# Patient Record
(demographics unavailable — no encounter records)

---

## 2017-03-30 NOTE — L&D FLOW SHEET
=================================================================

LD Flowsheet

=================================================================

Datetime Report Generated by CPN: 03/30/2017 20:00

   

   

=================================================================

Datetime: 03/30/2017 19:52

=================================================================

   

 NBP Sys/Rebecca/Mean (mmHg):  140 (QS system process)

:  74 (QS system process)

:  98 (QS system process)

 Pulse:  107 (QS system process)

 LaborFlag:  Labor (QS system process)

   

=================================================================

Datetime: 03/30/2017 19:50

=================================================================

   

 NBP Sys/Rebecca/Mean (mmHg):  144 (QS system process)

:  94 (QS system process)

:  115 (QS system process)

 LaborFlag:  Labor (QS system process)

   

=================================================================

Datetime: 03/30/2017 19:46

=================================================================

   

 NBP Sys/Rebecca/Mean (mmHg):  140 (Dacia Hernandez)

:  78 (Dacia Hernandez)

 Anesthesia Plans:  Epidural (Dacia Hernandez)

 Epidural Positioning:  Sitting (Dacia Hernandez)

 Epidural Procedure:  Test Dose (Dacia Hernandez)

 LaborFlag:  Labor (QS system process)

   

=================================================================

Datetime: 03/30/2017 19:44

=================================================================

   

 NBP Sys/Rebecca/Mean (mmHg):  139 (Dacia Hernandez)

:  77 (Dacia Hernandez)

 Anesthesia Plans:  Epidural (Dacia Hernandez)

 Epidural Positioning:  Sitting (Dacia Hernandez)

 Epidural Procedure:  Cath Placed (Dacia Hernandez)

 LaborFlag:  Labor (QS system process)

   

=================================================================

Datetime: 03/30/2017 19:43

=================================================================

   

 Anesthesia Plans:  Epidural (Dacia Hernandez)

 Epidural Positioning:  Sitting (Dacia Hernandez)

   

=================================================================

Datetime: 03/30/2017 19:42

=================================================================

   

 NBP Sys/Rebecca/Mean (mmHg):  140 (Dacia Hernandez)

:  76 (Dacia Hernandez)

 LaborFlag:  Labor (QS system process)

   

=================================================================

Datetime: 03/30/2017 19:40

=================================================================

   

 NBP Sys/Rebecca/Mean (mmHg):  138 (Dacia Hernandez)

:  79 (Dacia Hernandez)

 LaborFlag:  Labor (QS system process)

   

=================================================================

Datetime: 03/30/2017 19:38

=================================================================

   

 Anesthesia Comments:  Dr Kathrynkan at the bedside  (Dacia Hernandez)

   

=================================================================

Datetime: 03/30/2017 19:34

=================================================================

   

 NBP Sys/Rebecca/Mean (mmHg):  137 (QS system process)

:  78 (QS system process)

:  100 (QS system process)

 Pulse:  109 (QS system process)

 LaborFlag:  Labor (QS system process)

   

=================================================================

Datetime: 03/30/2017 19:29

=================================================================

   

 Pulse:  108 (QS system process)

 SpO2 (%):  92 (QS system process)

 LaborFlag:  Labor (QS system process)

   

=================================================================

Datetime: 03/30/2017 19:28

=================================================================

   

 Procedure Type:  epidural (Dacia Hernandez)

 Procedure Verify:  Correct Patient Identity; Correct Side and Site are

   Marked; Accurate Procedure Consent Form; Agreement on Procedure to

   be Done; Correct Patient Position; Relevant Images and Results are

   Properly Labeled and Displayed; Safety Precautions Based on Patient

   History or Medication Use (Dacia Hernandez)

 Anesthesia Plans:  Epidural (Dacia Hernandez)

 Epidural Positioning:  Sitting (Dacia Hernandez)

   

=================================================================

Datetime: 03/30/2017 19:22

=================================================================

   

 Anesthesia Comments:  patient sitting for epidural (Dacia Hernandez)

   

=================================================================

Datetime: 03/30/2017 19:19

=================================================================

   

 NBP Sys/Rebecca/Mean (mmHg):  148 (QS system process)

:  86 (QS system process)

:  110 (QS system process)

 Pulse:  115 (QS system process)

 LaborFlag:  Labor (QS system process)

   

=================================================================

Datetime: 03/30/2017 19:18

=================================================================

   

 Communication Comments:  Report given to AMary Hernandez RN (Shelley Rosenthal, RN)

   

=================================================================

Datetime: 03/30/2017 19:16

=================================================================

   

 NBP Sys/Rebecca/Mean (mmHg):  147 (QS system process)

:  76 (QS system process)

:  103 (QS system process)

 Pulse:  106 (QS system process)

 LaborFlag:  Labor (QS system process)

   

=================================================================

Datetime: 03/30/2017 19:15

=================================================================

   

 Monitor Mode:  External; Palpation (Shelley Rosenthal RN)

 Frequency (min):  1-4 (Shelley Rosenthal RN)

 Quality:  Moderate (Shelley Rosenthal RN)

 Duration (sec):  50-80 (Shelley Rosenthal RN)

 Resting Tone (Palpate):  Relaxed (Shelley Rosenthal RN)

 Monitor Mode:  External US (Shelley Rosenthal RN)

 FHR Baseline Rate :  130 (Shelley Rosenthal RN)

 Variability:  Moderate 6-25 bpm (Shelley Rosenthal RN)

 Accelerations:  15X15 (Shelley Rosenthal RN)

 Decelerations:  None (Shelley Rosenthal RN)

 Pitocin (milliunit):  Pitocin Remains (milliunits) @ 20 (Shelley Rosenthal RN)

 Procedure Type:  epidural (Dacia Hernandez)

 Procedure Verify:  Correct Patient Identity; Correct Side and Site are

   Marked; Accurate Procedure Consent Form; Agreement on Procedure to

   be Done; Correct Patient Position; Relevant Images and Results are

   Properly Labeled and Displayed; Safety Precautions Based on Patient

   History or Medication Use (Dacia Hernandez)

   

=================================================================

Datetime: 03/30/2017 19:13

=================================================================

   

 Monitor Interventions for FHR:  Ultrasound Adjusted (Shelley Rosenthal RN)

 Communication:  RN at Bedside (Shelley Rosenthal RN)

   

=================================================================

Datetime: 03/30/2017 19:11

=================================================================

   

 Patient Care Comments:  IV infiltrated. New IV started in L Forearm

   18gauge (Shelley Rosenthal RN)

   

=================================================================

Datetime: 03/30/2017 19:05

=================================================================

   

 NBP Sys/Rebecca/Mean (mmHg):  155 (QS system process)

:  105 (QS system process)

:  122 (QS system process)

 Pulse:  110 (QS system process)

 LaborFlag:  Labor (QS system process)

   

=================================================================

Datetime: 03/30/2017 19:00

=================================================================

   

 Monitor Mode:  External; Palpation (Shelley Rosenthal RN)

 Frequency (min):  1-4 (Shelley Rosenthal RN)

 Quality:  Moderate (Shelley Rosenthal RN)

 Duration (sec):  50-80 (Shelley Rosenthal RN)

 Resting Tone (Palpate):  Relaxed (Shelley Rosenthal RN)

 Monitor Mode:  External US (Shelley Rosenthal RN)

 FHR Baseline Rate :  125 (Shelley Rosenthal RN)

 Variability:  Moderate 6-25 bpm (Shelley Rosenthal RN)

 Accelerations:  15X15 (Shelley Rosenthal RN)

 Decelerations:  None (Shelley Rosenthal RN)

 Pitocin (milliunit):  Pitocin Remains (milliunits) @ 20 (Shelley Rosenthal RN)

 Procedure Type:  epidural (Daciamarlene Hernandez)

 Procedure Verify:  Correct Patient Identity; Correct Side and Site are

   Marked; Accurate Procedure Consent Form; Agreement on Procedure to

   be Done; Correct Patient Position; Relevant Images and Results are

   Properly Labeled and Displayed; Safety Precautions Based on Patient

   History or Medication Use (Daciamarlene Hernandez)

   

=================================================================

Datetime: 03/30/2017 18:56

=================================================================

   

 Bedside Blood Glucose:  65 L (Annotations: MD Notified) (QS system

   process)

 LaborFlag:  Labor (QS system process)

   

=================================================================

Datetime: 03/30/2017 18:49

=================================================================

   

 NBP Sys/Rebecca/Mean (mmHg):  141 (QS system process)

:  79 (QS system process)

:  102 (QS system process)

 Pulse:  103 (QS system process)

 LaborFlag:  Labor (QS system process)

   

=================================================================

Datetime: 03/30/2017 18:45

=================================================================

   

 Monitor Mode:  External (Shelley Rosenthal RN)

 Frequency (min):  1-3 (Shelley Rosenthal RN)

 Quality:  Mild/Moderate (Shelley Rosenthal RN)

 Duration (sec):  50-80 (Shelley Rosenthal RN)

 Resting Tone (Palpate):  Relaxed (Shelley Rosenthal RN)

 Monitor Mode:  External US (Shelley Rosenthal RN)

 FHR Baseline Rate :  120 (Shelley Rosenthal RN)

 Variability:  Moderate 6-25 bpm (Shelley Rosenthal RN)

 Accelerations:  15X15 (Shelley Rosenthal RN)

 Decelerations:  None (Shelley Rosenthal RN)

 Pitocin (milliunit):  Pitocin Remains (milliunits) @ 20 (Shelley Rosenthal RN)

   

=================================================================

Datetime: 03/30/2017 18:39

=================================================================

   

 Monitor Interventions for UA:  [place] Adjusted (Shelley Rosenthal RN)

 Dilatation (cm):  5.5 (Shelley Rosenthal RN)

 Effacement (%):  80 (Shelley Rosenthal RN)

 Station:  -1 (Shelely Rosenthal RN)

 Exam by:  CHERYL Baker RNC (Shelley Rosenthal RN)

   

=================================================================

Datetime: 03/30/2017 18:34

=================================================================

   

 NBP Sys/Rebecca/Mean (mmHg):  137 (QS system process)

:  99 (QS system process)

:  114 (QS system process)

 Pulse:  100 (QS system process)

 LaborFlag:  Labor (QS system process)

   

=================================================================

Datetime: 03/30/2017 18:30

=================================================================

   

 Monitor Mode:  External (Shelley Rosenthal RN)

 Frequency (min):  1-4 (Shelley Rosenthal RN)

 Quality:  Mild/Moderate (Shelley Rosenthal RN)

 Duration (sec):  50-80 (Shelley Rosenthal RN)

 Resting Tone (Palpate):  Relaxed (Shelley Rosenthal RN)

 Monitor Mode:  External US (Shelley Rosenthal RN)

 FHR Baseline Rate :  125 (Shelley Rosenthal RN)

 Variability:  Moderate 6-25 bpm (Shelley Rosenthal RN)

 Accelerations:  15X15 (Shelley Rosenthal RN)

 Decelerations:  None (Shelley Rosenthal RN)

 Pitocin (milliunit):  Pitocin Remains (milliunits) @ 20 (Shelley Rosenthal RN)

   

=================================================================

Datetime: 03/30/2017 18:16

=================================================================

   

 Pain Coping:  Requesting Pain Medication or Epidural (Shelley Rosenthal RN)

 IV/Blood Work:  IV Bolus Started (Shelley Rosenthal RN)

 Procedure Verify:  Correct Patient Identity; Correct Side and Site are

   Marked; Accurate Procedure Consent Form; Agreement on Procedure to

   be Done (Shelley Rosenthal RN)

 Anesthesia Plans:  Epidural (Shelley Rosenthal RN)

   

=================================================================

Datetime: 03/30/2017 18:15

=================================================================

   

 Monitor Mode:  External; Palpation (Shelley Rosenthal RN)

 Quality:  Mild/Moderate (Shelley Rosenthal RN)

 Resting Tone (Palpate):  Relaxed (Shelley Rosenthal RN)

 Contraction Comments:  contractions not tracing on monitor (Shelley Rosenthal RN)

 Monitor Mode:  External US (Shelley Rosenthal RN)

 FHR Baseline Rate :  135 (Shelley Rosenthal RN)

 Variability:  Moderate 6-25 bpm (Shelley Rosenthal RN)

 Accelerations:  10X10 (Shelley Rosenthal RN)

 Decelerations:  None (Shelley Rosenthal RN)

 Pitocin (milliunit):  Pitocin Remains (milliunits) @ 20 (Shelley Rosenthal RN)

   

=================================================================

Datetime: 03/30/2017 18:14

=================================================================

   

 Dilatation (cm):  3.0 (Shelley Rosenthal, RN)

 Effacement (%):  70 (Shelley Rosenthal, RN)

 Station:  -1 (Shelley Rosenthal, RN)

 Exam by:  JORGITO Rosenthal RN (Shelley Rosenthal RN)

   

=================================================================

Datetime: 03/30/2017 18:10

=================================================================

   

 Monitor Interventions for UA:  [place] Adjusted (Shelley Rosenthal, RN)

   

=================================================================

Datetime: 03/30/2017 18:01

=================================================================

   

 Pitocin (milliunit):  Pitocin Remains (milliunits) @ 20 (Shelley Rosenthal,

   RN)

   

=================================================================

Datetime: 03/30/2017 18:00

=================================================================

   

 Monitor Mode:  External (Shelley Rosenthal, RN)

 Frequency (min):  1-3 (Shelley Rosenthal, RN)

 Quality:  Mild/Moderate (Shelley Rosenthal, RN)

 Duration (sec):  50-80 (Shelley Rosenthal, RN)

 Resting Tone (Palpate):  Relaxed (Shelley Rosenthal, RN)

 Monitor Mode:  External US (Shelley Rosenthal, RN)

 FHR Baseline Rate :  135 (Shelley Rosenthal, RN)

 Variability:  Moderate 6-25 bpm (Shelley Rosenthal, RN)

 Accelerations:  10X10 (Shelley Rosenthal, RN)

 Decelerations:  None (Shleley Rosenthal, RN)

   

=================================================================

Datetime: 03/30/2017 17:55

=================================================================

   

 Monitor Interventions for UA:  [place] Adjusted (Shelley Rosenthal, RN)

   

=================================================================

Datetime: 03/30/2017 17:54

=================================================================

   

 Monitor Interventions for FHR:  Ultrasound Adjusted (Shelley Rosenthal RN)

 Communication:  RN at Bedside (Shelley Rosenthal RN)

   

=================================================================

Datetime: 03/30/2017 17:45

=================================================================

   

 Monitor Mode:  External; Palpation (Shelley Rosenthal RN)

 Frequency (min):  1-3 (Shelley Rosenthal RN)

 Quality:  Mild/Moderate (Shelley Rosenthal RN)

 Duration (sec):  50-80 (Shelley Rosenthal, RN)

 Resting Tone (Palpate):  Relaxed (Shelley Rosenthal RN)

 Monitor Mode:  External US (Shleley Rosenthal RN)

 FHR Baseline Rate :  125 (Shelley Rosenthal RN)

 Variability:  Moderate 6-25 bpm (Shelley Rosenthal, RN)

 Accelerations:  15X15 (Shelley Rosenthal, RN)

 Decelerations:  None (Shelley Rosenthal, RN)

 Pain Coping:  Breathing Through Contractions (Shelley Rosenthal RN)

 Pain Assessment Comments:  Pt asking about different pain control.

   Denies need for pain medicine or epidural at this time. Offered warm

   compress, position changes. FOB at bedside.  (Shelley Rosenthal RN)

 Pitocin (milliunit):  Pitocin Remains (milliunits) @ 20 (Shelley Rosenthal

   RN)

 Comfort Measures:  Breathing/Relaxation; Coaching; Hot/Cold Pack;

   Family Support (Shelley Rosenthal RN)

 LaborFlag:  Labor (QS system process)

   

=================================================================

Datetime: 03/30/2017 17:35

=================================================================

   

 NBP Sys/Rebecca/Mean (mmHg):  141 (QS system process)

:  77 (QS system process)

:  103 (QS system process)

 Pulse:  104 (QS system process)

 LaborFlag:  Labor (QS system process)

   

=================================================================

Datetime: 03/30/2017 17:30

=================================================================

   

 Monitor Mode:  External; Palpation (Shelley Rosenthal RN)

 Frequency (min):  1-5 (Shelley Rosenthal RN)

 Quality:  Mild/Moderate (Shelley Rosenthal RN)

 Duration (sec):  50-80 (Shelley Rosenthal RN)

 Resting Tone (Palpate):  Relaxed (Shelley Rosenthal RN)

 Monitor Mode:  External US (Shelley Rosenthal RN)

 FHR Baseline Rate :  135 (Shelley Rosenthal RN)

 Variability:  Moderate 6-25 bpm (Shelley Rosenthal RN)

 Accelerations:  15X15 (Shelley Rosenthal RN)

 Decelerations:  None (Shelley Rosenthal RN)

 Pitocin (milliunit):  Pitocin Remains (milliunits) @ 20 (Shelley Rosenthal RN)

   

=================================================================

Datetime: 03/30/2017 17:19

=================================================================

   

 NBP Sys/Rebecca/Mean (mmHg):  132 (QS system process)

:  73 (QS system process)

:  97 (QS system process)

 Pulse:  90 (QS system process)

 LaborFlag:  Labor (QS system process)

   

=================================================================

Datetime: 03/30/2017 17:15

=================================================================

   

 Monitor Mode:  External (Shelley Rosenthal RN)

 Frequency (min):  1-3 (Shelley Rosenthal RN)

 Quality:  Mild (Shelley Rosenthal RN)

 Duration (sec):  50-80 (Shelley Rosenthal RN)

 Resting Tone (Palpate):  Relaxed (Shelley Rosenthal RN)

 Monitor Mode:  External US (Shelley Rosenthal RN)

 FHR Baseline Rate :  135 (Shelley Rosenthal RN)

 Variability:  Moderate 6-25 bpm (Shelley Rosenthal RN)

 Accelerations:  15X15 (Shelley Rosenthal RN)

 Decelerations:  None (Shelley Rosenthal RN)

 Pitocin (milliunit):  Pitocin Remains (milliunits) @ 20 (Shelley Rosenthal RN)

   

=================================================================

Datetime: 03/30/2017 17:09

=================================================================

   

 Respirations:  16 (Shelley Rosenthal RN)

 Temperature (F):  98.6 (Shelley Rosenthal RN)

 Temperature (C):  37.0 (QS system process)

 Pain Scale:  4 (Shelley Rosenthal RN)

 Pain Presence:  Intermittent (Shelley Rosenthal RN)

 Pain Type:  Contraction (Shelley Rosenthal RN)

 Pain Location:  Abdomen (Shelley Rosenthal RN)

 Pain Relief Measures:  Comfort Measures (Shelley Rosenthal RN)

 Pain Coping:  Talking Through Contractions (Shelley Rosenthal RN)

 LaborFlag:  Labor (QS system process)

   

=================================================================

Datetime: 03/30/2017 17:04

=================================================================

   

 NBP Sys/Rebecca/Mean (mmHg):  135 (QS system process)

:  77 (QS system process)

:  100 (QS system process)

 Pulse:  95 (QS system process)

 LaborFlag:  Labor (QS system process)

   

=================================================================

Datetime: 03/30/2017 17:00

=================================================================

   

 Monitor Mode:  External (Shelley Rosenthal, RN)

 Frequency (min):  1-3 (Shelley Rosenthal, RN)

 Quality:  Mild (Shelley Galo, RN)

 Duration (sec):  50-80 (Shelley Galo, RN)

 Resting Tone (Palpate):  Relaxed (Shelley Rosenthal, RN)

 Monitor Mode:  External US (Shelley Rosenthal, RN)

 FHR Baseline Rate :  125 (Shelley Galo, RN)

 Variability:  Moderate 6-25 bpm (Shelley Rosenthal, RN)

 Accelerations:  15X15 (Shelley Galo, RN)

 Decelerations:  None (Shelleysawyer Rosenthal, RN)

 Pitocin (milliunit):  Pitocin Remains (milliunits) @ 20 (Shelley Galo,

   RN)

   

=================================================================

Datetime: 03/30/2017 16:50

=================================================================

   

 NBP Sys/Rebecca/Mean (mmHg):  134 (QS system process)

:  77 (QS system process)

:  99 (QS system process)

 Pulse:  96 (QS system process)

 LaborFlag:  Labor (QS system process)

   

=================================================================

Datetime: 03/30/2017 16:45

=================================================================

   

 Monitor Mode:  External (Shelley Rosenthal, RN)

 Frequency (min):  1-5 (Shelley Rosenthal RN)

 Quality:  Mild (Shelley Rosenthal, RN)

 Duration (sec):  50-80 (Shelley Rosenthal, RN)

 Duration Criteria:  Less than Two 120 Second Contractions (Shelley Rosenthal, RN)

 Pattern:  Normal: <= 5 Contractions in 10 Minutes (Shelley Rosenthal RN)

 Resting Tone (Palpate):  Relaxed (Shelley Rosenthal, RN)

 Monitor Mode:  External US (Shelley Rosenthal, RN)

 FHR Baseline Rate :  125 (Shelley Rosenthal, RN)

 Variability:  Moderate 6-25 bpm (Shelley Rosenthal, RN)

 Accelerations:  15X15 (Shelley Rosenthal, RN)

 Decelerations:  None (Shelley Rosenthal, RN)

 Pitocin (milliunit):  Pitocin Remains (milliunits) @ 20 (Shelley Rosenthal,

   RN)

   

=================================================================

Datetime: 03/30/2017 16:34

=================================================================

   

 NBP Sys/[place]/Mean (mmHg):  120 (QS system process)

:  71 (QS system process)

:  90 (QS system process)

 Pulse:  91 (QS system process)

 LaborFlag:  Labor (QS system process)

   

=================================================================

Datetime: 03/30/2017 16:30

=================================================================

   

 Monitor Mode:  External (Shelley Galo, RN)

 Frequency (min):  1-4 (Shelley Rosenthal, RN)

 Quality:  Mild (Shelley Rosenthal, RN)

 Duration (sec):  50-80 (Shelley Galo, RN)

 Resting Tone (Palpate):  Relaxed (Shelleysawyer Rosenthal, RN)

 Monitor Mode:  External US (Shelleysawyer Rosenthal, RN)

 FHR Baseline Rate :  125 (Shelley Galo, RN)

 Variability:  Moderate 6-25 bpm (Shelley Galo, RN)

 Accelerations:  15X15 (Shelley Galo, RN)

 Decelerations:  None (Shelleysawyer Rosenthal, RN)

 Pitocin (milliunit):  Pitocin Remains (milliunits) @ 20 (Shelley Galo,

   RN)

   

=================================================================

Datetime: 03/30/2017 16:19

=================================================================

   

 NBP Sys/Rebecca/Mean (mmHg):  128 (QS system process)

:  78 (QS system process)

:  97 (QS system process)

 Pulse:  81 (QS system process)

 LaborFlag:  Labor (QS system process)

   

=================================================================

Datetime: 03/30/2017 16:15

=================================================================

   

 Monitor Mode:  External (Shelley Rosenthal RN)

 Frequency (min):  1-3 (Shelley Rosenthal RN)

 Quality:  Mild (Shelley Rosenthal RN)

 Duration (sec):  50-80 (Shelley Rosenthal RN)

 Resting Tone (Palpate):  Relaxed (Shelley Rosenthal RN)

 Monitor Mode:  External US (Shelley Rosenthal RN)

 FHR Baseline Rate :  135 (Shelley Rosenthal RN)

 Variability:  Moderate 6-25 bpm (Shelley Rosenthal RN)

 Accelerations:  15X15 (Shelley Rosenthal RN)

 Decelerations:  None (Shelley Rosenthal RN)

 Pitocin (milliunit):  Pitocin Remains (milliunits) @ 20 (Shelley Rosenthal RN)

   

=================================================================

Datetime: 03/30/2017 16:07

=================================================================

   

 Dilatation (cm):  2.0 (Shelley Rosenthal RN)

 Effacement (%):  60 (Shelley Rosenthal RN)

 Station:  -2 (Shelley Rosenthal RN)

 Exam by:  REX alex CNM (Shelley Rosenthal RN)

 Membrane Status:  Ruptured (Shelley Rosenthal RN)

 Membranes Rupture Method:  Artificial (Shelley Rosenthal RN)

 Amniotic Fluid Color:  Clear (Shelley Rosenthal RN)

 Amniotic Fluid Amount:  Large (Shelley Rosenthal RN)

 Amniotic Fluid Odor:  Normal (Shelley Rosenthal RN)

   

=================================================================

Datetime: 03/30/2017 16:05

=================================================================

   

 Communication Comments:  REX Alex CNM at bedside (Shelley Rosenthal RN)

   

=================================================================

Datetime: 03/30/2017 16:00

=================================================================

   

 Monitor Mode:  External (Shelley Rosenthal RN)

 Frequency (min):  1-3 (Shelley Rosenthal RN)

 Quality:  Mild (Shelley Rosenthal RN)

 Duration (sec):  50-80 (Shelley Rosenthal RN)

 Resting Tone (Palpate):  Relaxed (Shelley Rosenthal RN)

 Monitor Mode:  External US (Shelley Rosenthal RN)

 FHR Baseline Rate :  130 (Shelley Rosenthal RN)

 Variability:  Moderate 6-25 bpm (Shelley Rosenthal RN)

 Accelerations:  15X15 (Shelley Rosenthal RN)

 Decelerations:  None (Shelley Rosenthal RN)

 Pitocin (milliunit):  Pitocin Remains (milliunits) @ 20 (Shelley Rosenthal RN)

 I/O Interventions:  Up to BR (Shelley Rosenthal RN)

   

=================================================================

Datetime: 03/30/2017 15:45

=================================================================

   

 Monitor Mode:  External (Shelley Rosenthal, RN)

 Frequency (min):  1-3 (Shelleysawyer Rosenthal, RN)

 Quality:  Mild (Shelley Galo, RN)

 Duration (sec):  50-80 (Shelleysawyer Rosenthal, RN)

 Duration Criteria:  Less than Two 120 Second Contractions (Shelleysawyer Rosenthal, RN)

 Pattern:  Normal: <= 5 Contractions in 10 Minutes (Shelley Galo, RN)

 Resting Tone (Palpate):  Relaxed (Shelley Rosenthal, RN)

 Monitor Mode:  External US (Shelley Rosenthal, RN)

 FHR Baseline Rate :  130 (Shelleysawyer Rosenthal, RN)

 Variability:  Moderate 6-25 bpm (Shelley Rosenthal, RN)

 Accelerations:  15X15 (Shelley Rosenthal, RN)

 Decelerations:  None (Shelleysawyer Rosenthal, RN)

 Pitocin (milliunit):  Pitocin Remains (milliunits) @ 20 (Shelley Galo,

   RN)

   

=================================================================

Datetime: 03/30/2017 15:37

=================================================================

   

 I/O Interventions:  Up to BR (Shelley Rosenthal, RN)

   

=================================================================

Datetime: 03/30/2017 15:30

=================================================================

   

 Monitor Mode:  External (Shelleysawyer Rosenthal, RN)

 Frequency (min):  1-3 (Shelley Rosenthal, RN)

 Quality:  Mild (Shelley Galo, RN)

 Duration (sec):  50-80 (Shelleysawyer Rosenthal, RN)

 Resting Tone (Palpate):  Relaxed (Shelley Rosenthal, RN)

 Monitor Mode:  External US (Shelley Rosenthal, RN)

 FHR Baseline Rate :  130 (Shelleysawyer Rosenthal, RN)

 Variability:  Moderate 6-25 bpm (Shelleysawyer Rosenthal, RN)

 Accelerations:  15X15 (Shelleysawyer Rosenthal, RN)

 Decelerations:  None (Shelley Rosenthal, RN)

 Pitocin (milliunit):  Pitocin Remains (milliunits) @ 20 (Shelley Galo,

   RN)

   

=================================================================

Datetime: 03/30/2017 15:23

=================================================================

   

 IV/Blood Work:  New IV Bag Hung (Shelley Galo, RN)

   

=================================================================

Datetime: 03/30/2017 15:15

=================================================================

   

 Monitor Mode:  External (Shelley Rosenthal, RN)

 Frequency (min):  1-3 (Shelley Rosenthal, RN)

 Quality:  Mild (Shelley Rosenthal, RN)

 Duration (sec):  50-80 (Shelley Rosenthal, RN)

 Resting Tone (Palpate):  Relaxed (Shelley Rosenthal, RN)

 Monitor Mode:  External US (Shelley Rosenthal, RN)

 FHR Baseline Rate :  135 (Shelley Rosenthal, RN)

 Variability:  Moderate 6-25 bpm (Shelley Rosenthal, RN)

 Accelerations:  15X15 (Shelley Rosenthal, RN)

 Decelerations:  None (Shelley Rosenthal, RN)

 Pitocin (milliunit):  Pitocin Remains (milliunits) @ 20 (Shelley Rosenthal,

   RN)

   

=================================================================

Datetime: 03/30/2017 15:00

=================================================================

   

 Monitor Mode:  External (Shelley Rosenthal, RN)

 Frequency (min):  1-3 (Shelley Rosenthal, RN)

 Quality:  Mild (Shelley Rosenthal, RN)

 Duration (sec):  50-80 (Shelley Rosenthal, RN)

 Resting Tone (Palpate):  Relaxed (Shelley Rosenthal, RN)

 Monitor Mode:  External US (Shelley Rosenthal, RN)

 FHR Baseline Rate :  135 (Shelley Rosenthal, RN)

 Variability:  Moderate 6-25 bpm (Shelley Rosenthal, RN)

 Accelerations:  15X15 (Shelley Rosenthal, RN)

 Decelerations:  None (Shelley Rosenthal, RN)

 Pitocin (milliunit):  Pitocin Remains (milliunits) @ 20 (Shelley Rosenthal,

   RN)

   

=================================================================

Datetime: 03/30/2017 14:45

=================================================================

   

 Monitor Mode:  External (Shelley Rosenthal RN)

 Frequency (min):  1-2 (Shelley Rosenthal RN)

 Quality:  Mild (Shelley Rosenthal, RN)

 Duration (sec):  50-80 (Shelley Rosenthal RN)

 Resting Tone (Palpate):  Relaxed (Shelley Rosenthal RN)

 Monitor Mode:  External US (Shelley Rosenthal RN)

 FHR Baseline Rate :  125 (Shelley Rosenthal RN)

 Variability:  Moderate 6-25 bpm (Shelley Rosenthal, RN)

 Accelerations:  15X15 (Shelley Rosenthal, RN)

 Decelerations:  None (Shelley Rosenthal, RN)

 Pitocin (milliunit):  Pitocin Remains (milliunits) @ 20 (Shelley Rosenthal,

   RN)

   

=================================================================

Datetime: 03/30/2017 14:33

=================================================================

   

 Monitor Interventions for FHR:  Ultrasound Adjusted (Shelley Rosenthal RN)

 Communication:  RN at Bedside (Shelley Rosenthal RN)

   

=================================================================

Datetime: 03/30/2017 14:31

=================================================================

   

 Patient Care Comments:  Pt sitting on edge of bed (Shelley Rosenthal, RN)

   

=================================================================

Datetime: 03/30/2017 14:30

=================================================================

   

 Monitor Mode:  External (Shelley Rosenthal RN)

 Frequency (min):  1-3 (Shelley Rosenthal RN)

 Quality:  Mild (Shelley Rosenthal, RN)

 Duration (sec):  50-80 (Shelley Rosenthal, RN)

 Resting Tone (Palpate):  Relaxed (Shelley Rosenthal RN)

 Monitor Mode:  External US (Shelley Rosenthal RN)

 FHR Baseline Rate :  130 (Shelley Rosenthal RN)

 Variability:  Moderate 6-25 bpm (Shelley Rosenthal, RN)

 Accelerations:  15X15 (Shelley Rosenthal, RN)

 Decelerations:  None (Shelley Rosenthal, RN)

 Pitocin (milliunit):  Pitocin Remains (milliunits) @ 20 (Shelley Rosenthal

   RN)

   

=================================================================

Datetime: 03/30/2017 14:19

=================================================================

   

 NBP Sys/Rebecca/Mean (mmHg):  113 (QS system process)

:  71 (QS system process)

:  87 (QS system process)

 Pulse:  96 (QS system process)

 LaborFlag:  Labor (QS system process)

   

=================================================================

Datetime: 03/30/2017 14:15

=================================================================

   

 Monitor Mode:  External (Shelley Rosenthal RN)

 Frequency (min):  1-3 (Shelley Rosenthal RN)

 Quality:  Mild (Shelley Rosenthal RN)

 Duration (sec):  50-80 (Shelley Rosenthal RN)

 Resting Tone (Palpate):  Relaxed (Shelley Rosenthal RN)

 Monitor Mode:  External US (Shelley Rosenthal RN)

 FHR Baseline Rate :  135 (Shelley Rosenthal RN)

 Variability:  Moderate 6-25 bpm (Shelley Rosenthal RN)

 Accelerations:  15X15 (Shelley Rosenthal, RN)

 Decelerations:  None (Shelley Rosenthal, RN)

 Pitocin (milliunit):  Pitocin Remains (milliunits) @ 20 (Shelley Rosenthal RN)

   

=================================================================

Datetime: 03/30/2017 14:05

=================================================================

   

 NBP Sys/Rebecca/Mean (mmHg):  126 (QS system process)

:  76 (QS system process)

:  94 (QS system process)

 Pulse:  96 (QS system process)

 LaborFlag:  Labor (QS system process)

   

=================================================================

Datetime: 03/30/2017 14:01

=================================================================

   

 Patient Position/Activity:  Right Lateral (Shelley Rosenthal RN)

   

=================================================================

Datetime: 03/30/2017 14:00

=================================================================

   

 Monitor Mode:  External (Shelley Rosenthal RN)

 Frequency (min):  1-2 (Shelley Rosenthal RN)

 Quality:  Mild (Shelley Rosenthal RN)

 Duration (sec):  50-80 (Shelley Rosenthal RN)

 Resting Tone (Palpate):  Relaxed (Shelley Rosenthal RN)

 Monitor Mode:  External US (Shelley Rosenthal RN)

 FHR Baseline Rate :  135 (Shelley Rosenthal RN)

 Variability:  Moderate 6-25 bpm (Shelley Rosenthal RN)

 Accelerations:  15X15 (Shelley Rosenthal RN)

 Decelerations:  None (Shelley Rosenthal RN)

 Pitocin (milliunit):  Pitocin Remains (milliunits) @ 20 (Shelley Rosenthal RN)

   

=================================================================

Datetime: 03/30/2017 13:49

=================================================================

   

 NBP Sys/Rebecca/Mean (mmHg):  134 (QS system process)

:  67 (QS system process)

:  95 (QS system process)

 Pulse:  98 (QS system process)

 LaborFlag:  Labor (QS system process)

   

=================================================================

Datetime: 03/30/2017 13:45

=================================================================

   

 Monitor Mode:  External (Shelley Rosenthal RN)

 Frequency (min):  1-3 (Shelley Rosenthal RN)

 Quality:  Mild (Shelley Rosenthal RN)

 Duration (sec):  50-80 (Shelley Rosenthal RN)

 Resting Tone (Palpate):  Relaxed (Shelley Rosenthal RN)

 Monitor Mode:  External US (Shelley Rosenthal RN)

 FHR Baseline Rate :  135 (Shelley Rosenthal RN)

 Variability:  Moderate 6-25 bpm (Shelley Rosenthal RN)

 Accelerations:  15X15 (Shelley Rosenthal RN)

 Decelerations:  None (Shelley Rosenthal RN)

 Pitocin (milliunit):  Pitocin Remains (milliunits) @ 20 (Shelley Rosenthal RN)

   

=================================================================

Datetime: 03/30/2017 13:34

=================================================================

   

 NBP Sys/Rebecca/Mean (mmHg):  126 (QS system process)

:  66 (QS system process)

:  85 (QS system process)

 Pulse:  96 (QS system process)

 LaborFlag:  Labor (QS system process)

   

=================================================================

Datetime: 03/30/2017 13:30

=================================================================

   

 Monitor Mode:  External (Shelley Rosenthal RN)

 Frequency (min):  1-3 (Shelley Rosenthal RN)

 Quality:  Mild (Shelley Rosenthal RN)

 Duration (sec):  50-80 (Shelley Rosenthal RN)

 Resting Tone (Palpate):  Relaxed (Shelley Rosenthal RN)

 Monitor Mode:  External US (Shelley Rosenthal RN)

 FHR Baseline Rate :  130 (Shelley Rosenthal RN)

 Variability:  Moderate 6-25 bpm (Shelley Rosenthal RN)

 Accelerations:  15X15 (Shelley Rosenthal RN)

 Decelerations:  None (Shelley Rosenthal RN)

 Pitocin (milliunit):  Pitocin Increased to (milliunits) @ 20 (Shelley Rosenthal RN)

   

=================================================================

Datetime: 03/30/2017 13:27

=================================================================

   

 Patient Position/Activity:  Left Lateral (Shelley Rosenthal, RN)

   

=================================================================

Datetime: 03/30/2017 13:26

=================================================================

   

 Comments:  tracing maternal HR, adjusted ultrasound (Shelley Rosenthal, RN)

   

=================================================================

Datetime: 03/30/2017 13:20

=================================================================

   

 I/O Interventions:  Up to BR (Shelley Rosenthal, RN)

   

=================================================================

Datetime: 03/30/2017 13:15

=================================================================

   

 Monitor Mode:  External (Shelley Rosenthal RN)

 Frequency (min):  1-2 (Shelley Rosenthal, RN)

 Quality:  Mild (Shelley Rosenthal, RN)

 Duration (sec):  50-80 (Shelley Rosenthal, RN)

 Resting Tone (Palpate):  Relaxed (Shelley Rosenthal, RN)

 Monitor Mode:  External US (Shelley Rosenthal RN)

 FHR Baseline Rate :  135 (Shelley Rosenthal, RN)

 Variability:  Moderate 6-25 bpm (Shelley Rosenthal, RN)

 Accelerations:  15X15 (Shelley Rosenthal, RN)

 Decelerations:  None (Shelley Rosenthal, RN)

 Pitocin (milliunit):  Pitocin Increased to (milliunits) @ 18 (Shelley Rosenthal, RN)

   

=================================================================

Datetime: 03/30/2017 13:05

=================================================================

   

 NBP Sys/Rebecca/Mean (mmHg):  121 (QS system process)

:  82 (QS system process)

:  95 (QS system process)

 Pulse:  88 (QS system process)

 LaborFlag:  Labor (QS system process)

   

=================================================================

Datetime: 03/30/2017 13:00

=================================================================

   

 Monitor Mode:  External (Shelley Rosenthal, RN)

 Frequency (min):  1-3 (Shelley Rosenthal RN)

 Quality:  Mild (Shelley Rosenthal, RN)

 Duration (sec):  50-80 (Shelley Rosenthal, RN)

 Resting Tone (Palpate):  Relaxed (Shelley Rosenthal, RN)

 Monitor Mode:  External US (Shelley Rosenthal, RN)

 FHR Baseline Rate :  135 (Shelley Rosenthal RN)

 Variability:  Moderate 6-25 bpm (Shelley Rosenthal, RN)

 Accelerations:  15X15 (Shelley Rosenthal, RN)

 Decelerations:  None (Shelley Rosenthal, RN)

 Pitocin (milliunit):  Pitocin Increased to (milliunits) @ 16 (Shelley Rosenthal, RN)

   

=================================================================

Datetime: 03/30/2017 12:49

=================================================================

   

 NBP Sys/Rebecca/Mean (mmHg):  127 (QS system process)

:  57 (QS system process)

:  82 (QS system process)

 Pulse:  87 (QS system process)

 LaborFlag:  Labor (QS system process)

   

=================================================================

Datetime: 03/30/2017 12:45

=================================================================

   

 Monitor Mode:  External (Shelley Rosenthal, RN)

 Frequency (min):  1-4 (Shelley Rosenthal, RN)

 Quality:  Mild (Shelley Rosenthal, RN)

 Duration (sec):  50-80 (Shelley Rosenthal, RN)

 Resting Tone (Palpate):  Relaxed (Shelley Rosenthal, RN)

 Monitor Mode:  External US (Shelley Rosenthal, RN)

 FHR Baseline Rate :  125 (Shelley Rosenthal, RN)

 Variability:  Moderate 6-25 bpm (Shelley Rosenthal, RN)

 Accelerations:  None (Shelley Rosenthal, RN)

 Decelerations:  None (Shelley Rosenthal, RN)

 Pitocin (milliunit):  Pitocin Remains (milliunits) @ 14 (Shelley Rosenthal,

   RN)

   

=================================================================

Datetime: 03/30/2017 12:33

=================================================================

   

 Monitor Interventions for FHR:  Ultrasound Adjusted (Shelley Rosenthal, RN)

   

=================================================================

Datetime: 03/30/2017 12:30

=================================================================

   

 Respirations:  16 (Shelley Rosenthal RN)

 Temperature (F):  98.0 (Shelley Rosenthal RN)

 Temperature (C):  36.7 (QS system process)

 Monitor Mode:  External (Shelley Rosenthal RN)

 Frequency (min):  1-3 (Shelley Rosenthal RN)

 Quality:  Mild (Shelley Rosenthal RN)

 Duration (sec):  50-80 (Shelley Rosenthal RN)

 Resting Tone (Palpate):  Relaxed (Shelley Rosenthal RN)

 Monitor Mode:  External US (Shelley Rosenthal RN)

 FHR Baseline Rate :  125 (Shelley Rosenthal RN)

 Variability:  Moderate 6-25 bpm (Shelley Rosenthal RN)

 Decelerations:  None (Shelley Rosenthal RN)

 Pain Scale:  1 (Shelley Rosenthal RN)

 Pain Presence:  Intermittent (Shelley Rosenthal RN)

 Pain Type:  Contraction (Shelley Rosenthal RN)

 Pain Location:  Abdomen (Shelley Rosenthal RN)

 Pain Relief Measures:  Comfort Measures (Shelley Rosenthal RN)

 Pain Coping:  Talking Through Contractions (Shelley Rosenthal RN)

 Pitocin (milliunit):  Pitocin Remains (milliunits) @ 14 (Shelley Rosenthal RN)

 LaborFlag:  Labor (QS system process)

   

=================================================================

Datetime: 03/30/2017 12:29

=================================================================

   

 Patient Position/Activity:  Right Lateral (Shelley Rosenthal RN)

   

=================================================================

Datetime: 03/30/2017 12:19

=================================================================

   

 NBP Sys/Rebecca/Mean (mmHg):  111 (QS system process)

:  69 (QS system process)

:  84 (QS system process)

 Pulse:  94 (QS system process)

 LaborFlag:  Labor (QS system process)

   

=================================================================

Datetime: 03/30/2017 12:15

=================================================================

   

 Monitor Mode:  External (Shelley Rosenthal RN)

 Frequency (min):  1-2 (Shelley Rosenthal RN)

 Quality:  Mild (Shelley Rosenthal RN)

 Duration (sec):  50-80 (Shelley Rosenthal RN)

 Resting Tone (Palpate):  Relaxed (Shelley Rosenthal RN)

 Monitor Mode:  External US (Shelley Rosenthal RN)

 FHR Baseline Rate :  135 (Shelley Rosenthal RN)

 Variability:  Moderate 6-25 bpm (Shelley Rosenthal RN)

 Decelerations:  None (Shelley Rosenthal RN)

 Pitocin (milliunit):  Pitocin Increased to (milliunits) @ 14 (Shelley Rosenthal RN)

   

=================================================================

Datetime: 03/30/2017 12:01

=================================================================

   

 Monitor Interventions for UA:  [place] Adjusted (Shelley Rosenthal RN)

 Comfort Measures:  Rocking Chair (Shelley Rosenthal RN)

   

=================================================================

Datetime: 03/30/2017 12:00

=================================================================

   

 Monitor Mode:  External (Shelley Rosenthal RN)

 Frequency (min):  1-3 (Shelley Rosenthal RN)

 Quality:  Mild (Shelley Rosenthal RN)

 Duration (sec):  50-80 (Shelley Rosenthal RN)

 Resting Tone (Palpate):  Relaxed (Shelley Rosenthal RN)

 Comments:  unable to determine due to pt position on birthing ball. RN

   adjusting monitors (Shelley Rosenthal RN)

 Pitocin (milliunit):  Pitocin Remains (milliunits) @ 12 (Shelley Rosenthal RN)

   

=================================================================

Datetime: 03/30/2017 11:45

=================================================================

   

 Comments:  unable to determine due to pt being up in BR and sitting on

   birthing ball. RN adjusting monitors (Shelley Rosenthal, RN)

 Pitocin (milliunit):  Pitocin Remains (milliunits) @ 12 (Shelley Rosenthal,

   RN)

   

=================================================================

Datetime: 03/30/2017 11:37

=================================================================

   

 Monitor Interventions for FHR:  Ultrasound Adjusted (Shelley Rosenthal, RN)

 Patient Position/Activity:  Birthing Ball (Shelley Rosenthal, RN)

 Communication:  RN at Bedside (Shelley Rosenthal RN)

   

=================================================================

Datetime: 03/30/2017 11:32

=================================================================

   

 I/O Interventions:  Up to BR (Shelley Rosenthal, RN)

   

=================================================================

Datetime: 03/30/2017 11:30

=================================================================

   

 Monitor Mode:  External (Shelley Rosenthal, RN)

 Frequency (min):  1-3 (Shelley Rosenthal, RN)

 Quality:  Mild (Shelley Rosenthal, RN)

 Duration (sec):  50-80 (Shelley Rosenthal, RN)

 Resting Tone (Palpate):  Relaxed (Shelley Rosenthal, RN)

 Monitor Mode:  External US (Shelley Rosenthal, RN)

 FHR Baseline Rate :  135 (Shelley Rosenthal, RN)

 Variability:  Moderate 6-25 bpm (Shelley Rosenthal, RN)

 Accelerations:  15X15 (Shelley Rosenthal, RN)

 Decelerations:  None (Shelley Rosenthal, RN)

 Pitocin (milliunit):  Pitocin Increased to (milliunits) @ 12 (Shelley Rosenthal, RN)

   

=================================================================

Datetime: 03/30/2017 11:19

=================================================================

   

 NBP Sys/Rebecca/Mean (mmHg):  126 (QS system process)

:  75 (QS system process)

:  94 (QS system process)

 Pulse:  88 (QS system process)

 LaborFlag:  Labor (QS system process)

   

=================================================================

Datetime: 03/30/2017 11:15

=================================================================

   

 Monitor Mode:  External (Shelley Rosenthal, RN)

 Frequency (min):  1-3 (Shelley Rosenthal, RN)

 Quality:  Mild (Shelley Rosenthal, RN)

 Duration (sec):  50-80 (Shelley Rosenthal, RN)

 Resting Tone (Palpate):  Relaxed (Shelley Rosenthal, RN)

 Monitor Mode:  External US (Shelley Rosenthal, RN)

 FHR Baseline Rate :  125 (Shelley Rosenthal, RN)

 Variability:  Moderate 6-25 bpm (Shelley Rosenthal, RN)

 Accelerations:  15X15 (Shelley Rosenthal, RN)

 Decelerations:  None (Shelley Rosenthal, RN)

 Pitocin (milliunit):  Pitocin Remains (milliunits) @ 10 (Shelley Rosenthal,

   RN)

   

=================================================================

Datetime: 03/30/2017 11:04

=================================================================

   

 NBP Sys/Rebecca/Mean (mmHg):  119 (QS system process)

:  76 (QS system process)

:  92 (QS system process)

 Pulse:  97 (QS system process)

 LaborFlag:  Labor (QS system process)

   

=================================================================

Datetime: 03/30/2017 11:00

=================================================================

   

 Monitor Mode:  External (Shelley Rosenthal, RN)

 Frequency (min):  1-3 (Shelley Rosenthal RN)

 Quality:  Mild (Shelley Rosenthal RN)

 Duration (sec):  50-80 (Shelley Rosenthal RN)

 Duration Criteria:  Less than Two 120 Second Contractions (Shelley Rosenthal RN)

 Pattern:  Normal: <= 5 Contractions in 10 Minutes (Shelley Rosenthal RN)

 Resting Tone (Palpate):  Relaxed (Shelley Rosenthal RN)

 Monitor Mode:  External US (Shelley Rosenthal RN)

 FHR Baseline Rate :  130 (Shelley Rosenthal RN)

 Variability:  Moderate 6-25 bpm (Shelley Rosenthal, RN)

 Accelerations:  15X15 (Shelley Rosenthal, RN)

 Decelerations:  None (Shelley Rosenthal RN)

 Pitocin (milliunit):  Pitocin Increased to (milliunits) @ 10 (Shelley Rosenthal RN)

   

=================================================================

Datetime: 03/30/2017 10:56

=================================================================

   

 Bedside Blood Glucose:  95 (QS system process)

 LaborFlag:  Labor (QS system process)

   

=================================================================

Datetime: 03/30/2017 10:49

=================================================================

   

 NBP Sys/Rebecca/Mean (mmHg):  123 (QS system process)

:  79 (QS system process)

:  95 (QS system process)

 Pulse:  94 (QS system process)

 LaborFlag:  Labor (QS system process)

   

=================================================================

Datetime: 03/30/2017 10:45

=================================================================

   

 Monitor Mode:  External (Shelley Rosenthal, RN)

 Frequency (min):  1-3 (Shelley Rosenthal RN)

 Quality:  Mild (Shelley Rosenthal RN)

 Duration (sec):  50-80 (Shelley Rosenthal RN)

 Resting Tone (Palpate):  Relaxed (Shelley Rosenthal RN)

 Monitor Mode:  External US (Shelley Rosenthal RN)

 FHR Baseline Rate :  135 (Shelley Rosenthal RN)

 Variability:  Moderate 6-25 bpm (Shelley Rosenthal RN)

 Accelerations:  15X15 (Shelley Rosenthal RN)

 Decelerations:  None (Shelley Rosenthal RN)

 Pitocin (milliunit):  Pitocin Increased to (milliunits) @ 8 (Shelley Rosenthal RN)

   

=================================================================

Datetime: 03/30/2017 10:30

=================================================================

   

 Monitor Mode:  External (Shelley Rosenthal RN)

 Frequency (min):  1-3 (Shelley Rosenthal RN)

 Quality:  Mild (Shelley Rosenthal RN)

 Duration (sec):  50-80 (Shelley Rosenthal RN)

 Resting Tone (Palpate):  Relaxed (Shelley Rosenthal RN)

 Monitor Mode:  External US (Shelley Rosenthal RN)

 FHR Baseline Rate :  145 (Shelley Rosenthal RN)

 Variability:  Moderate 6-25 bpm (Shelley Rosenthal RN)

 Accelerations:  15X15 (Shelley Rosenthal RN)

 Decelerations:  None (Shelley Rosenthal RN)

 Pitocin (milliunit):  Pitocin Increased to (milliunits) @ 6 (Shelley Rosenthal RN)

   

=================================================================

Datetime: 03/30/2017 10:21

=================================================================

   

 Monitor Interventions for FHR:  Ultrasound Adjusted (Shelley Rosenthal RN)

 Patient Position/Activity:  Left Lateral (Shelley Rosenthal RN)

   

=================================================================

Datetime: 03/30/2017 10:15

=================================================================

   

 Monitor Mode:  External (Shelley Rosenthal RN)

 Frequency (min):  1-3 (Shelley Rosenthal RN)

 Quality:  Mild (Shelley Rosenthal RN)

 Duration (sec):  50-80 (Shelley Rosenthal RN)

 Duration Criteria:  Less than Two 120 Second Contractions (Shelley Rosenthal RN)

 Pattern:  Normal: <= 5 Contractions in 10 Minutes (Shelley Rosenthal RN)

 Resting Tone (Palpate):  Relaxed (Shelley Rosenthal RN)

 Monitor Mode:  External US (Shelley Rosenthal RN)

 FHR Baseline Rate :  135 (Shelley Rosenthal, RN)

 Variability:  Moderate 6-25 bpm (Shelley Rosenthal RN)

 Accelerations:  15X15 (Shelley Rosenthal RN)

 Decelerations:  None (Shelley Rosenthal RN)

 Pitocin (milliunit):  Pitocin Increased to (milliunits) @ 4 (Shelley Rosenthal RN)

   

=================================================================

Datetime: 03/30/2017 10:00

=================================================================

   

 Monitor Mode:  External (Shelley Rosenthal RN)

 Frequency (min):  1.5-3 (Shelley Rosenthal RN)

 Quality:  Mild (Shelley Rosenthal RN)

 Duration (sec):  50-80 (Shelley Rosenthal RN)

 Resting Tone (Palpate):  Relaxed (Shelley Rosenthal RN)

 Monitor Mode:  External US (Shelley Rosenthal RN)

 FHR Baseline Rate :  135 (Shelley Rosenthal RN)

 Variability:  Moderate 6-25 bpm (Shelley Rosenthal, RN)

 Accelerations:  15X15 (Shelley Rosenthal RN)

 Decelerations:  None (Shelley Rosenthal RN)

 Pitocin (milliunit):  Pitocin Started (milliunits) @ 2; Pitocin 20

   Units in 1000ml NS (Shelley Rosenthal RN)

   

=================================================================

Datetime: 03/30/2017 09:51

=================================================================

   

 Communication Comments:  Dr Echeverria on unit. Orders to start Pitocin and

   increase q 15 minutes per protocol (Shelley Rosenthal RN)

   

=================================================================

Datetime: 03/30/2017 09:50

=================================================================

   

 NBP Sys/Rebecca/Mean (mmHg):  117 (QS system process)

:  68 (QS system process)

:  87 (QS system process)

 Pulse:  108 (QS system process)

 Dilatation (cm):  2.5 (Shelley Rosenthal RN)

 Effacement (%):  50 (Shelley Rosenthal RN)

 Station:  -2 (Shelley Rosenthal RN)

 Exam by:  JORGITO Rosenthal RN (Shelley Rosenthal RN)

 LaborFlag:  Labor (QS system process)

   

=================================================================

Datetime: 03/30/2017 09:46

=================================================================

   

 Patient Position/Activity:  Right Lateral (Shelley Rosenthal, RN)

   

=================================================================

Datetime: 03/30/2017 08:54

=================================================================

   

 Communication Comments:  Monitors removed for pt to walk. IV saline

   locked.  (Shelley Rosenthal, RN)

   

=================================================================

Datetime: 03/30/2017 08:30

=================================================================

   

 Monitor Mode:  External (Shelley Rosenthal RN)

 Frequency (min):  1-3 (Shelley Rosenthal RN)

 Quality:  Mild (Shelley Rosenthal RN)

 Duration (sec):  50-80 (Shelley Rosenthal RN)

 Resting Tone (Palpate):  Relaxed (Shelley Rosenthal RN)

 Monitor Mode:  External US (Shelley Rosenthal RN)

 FHR Baseline Rate :  135 (Shelley Rosenthal RN)

 Variability:  Moderate 6-25 bpm (Shelley Rosenthal RN)

 Accelerations:  15X15 (Shelley Rosenthal RN)

 Decelerations:  None (Shelley Rosenthal, RN)

   

=================================================================

Datetime: 03/30/2017 08:28

=================================================================

   

 Patient Care Comments:  Meal given (Shelley Rosenthal RN)

   

=================================================================

Datetime: 03/30/2017 08:07

=================================================================

   

 I/O Interventions:  Up to BR (Shelley Rosenthal RN)

   

=================================================================

Datetime: 03/30/2017 08:00

=================================================================

   

 Respirations:  14 (Shelley Rosenthal RN)

 Temperature (F):  98.6 (Shelley Rosenthal RN)

 Temperature (C):  37.0 (QS system process)

 Monitor Mode:  External (Shelley Rosenthal RN)

 Frequency (min):  1-3 (Shelley Rosenthal RN)

 Quality:  Mild (Shelley Rosenthal RN)

 Duration (sec):  50-70 (Shelley Rosenthal RN)

 Duration Criteria:  Less than Two 120 Second Contractions (Shelley Rosenthal RN)

 Pattern:  Normal: <= 5 Contractions in 10 Minutes (Shelley Rosenthal RN)

 Resting Tone (Palpate):  Relaxed (Shelley Rosenthal RN)

 Monitor Mode:  External US (Shelley Rosenthal RN)

 FHR Baseline Rate :  125 (Shelley Rosenthal RN)

 Variability:  Moderate 6-25 bpm (Shelley Rosenthal RN)

 Accelerations:  15X15 (Shelley Rosenthal RN)

 Decelerations:  None (Shelley Rosenthal RN)

 Pain Scale:  2 (Shelley Rosenthal RN)

 Pain Presence:  Intermittent (Shelley Rosenthal RN)

 Pain Type:  Contraction (Shelley Rosenthal RN)

 Pain Location:  Abdomen (Shelley Rosenthal RN)

 Pain Relief Measures:  Comfort Measures (Shelley Rosenthal RN)

 Pain Coping:  Talking Through Contractions (Shelley Rosenthal RN)

 Level of Consciousness:  Fully Conscious (Shelley Rosenthal RN)

 DTR's/Clonus:  DTRs 2+; No Clonus (Shelley Rosenthal RN)

 Headache:  Denies (Shelley Rosenthal RN)

 Breath Sounds, Left:  Clear and Equal (Shelley Rosenthal RN)

 Breath Sounds, Right:  Clear and Equal (Shelley Rosenthal RN)

 Nausea/Vomiting:  Denies (Shelley Rosenthal RN)

 RUQ Epigastric Pain:  Denies (Shelley Rosenthal RN)

 LaborFlag:  Labor (QS system process)

## 2017-03-30 NOTE — L&D FLOW SHEET
=================================================================

LD Flowsheet

=================================================================

Datetime Report Generated by CPN: 03/30/2017 08:00

   

   

=================================================================

Datetime: 03/30/2017 07:49

=================================================================

   

 NBP Sys/Rebecca/Mean (mmHg):  116 (QS system process)

:  65 (QS system process)

:  83 (QS system process)

 Pulse:  93 (QS system process)

 LaborFlag:  Labor (QS system process)

   

=================================================================

Datetime: 03/30/2017 07:11

=================================================================

   

   

=================================================================

Communication

=================================================================

   

 Communication:  Report Given to @ A Rosenthal RN (Gudelia Ledgerwood, RN)

   

=================================================================

Datetime: 03/30/2017 07:08

=================================================================

   

 NBP Sys/Rebecca/Mean (mmHg):  110 (QS system process)

:  58 (QS system process)

:  77 (QS system process)

 Pulse:  97 (QS system process)

 LaborFlag:  Labor (QS system process)

   

=================================================================

Datetime: 03/30/2017 07:00

=================================================================

   

   

=================================================================

Uterine Activity

=================================================================

   

 Monitor Mode:  External (Gudelia Ledgerwood, RN)

 Frequency (min):  1-3 (Gudelia Ledgerwood, RN)

 Quality:  Mild/Moderate (Gudelia Ledgerwood, RN)

 Duration (sec):  50-70 (Gudelia Ledgerwood, RN)

 Duration Criteria:  Less than Two 120 Second Contractions (Gudelia

   Ledgerwood, RN)

 Pattern:  Normal: <= 5 Contractions in 10 Minutes (Gudelia Ledgerwood, RN)

 Resting Tone (Palpate):  Relaxed (Gudelia Ledgerwood, RN)

   

=================================================================

Fetal Assessment A

=================================================================

   

 Monitor Mode:  External US (Gudelia Ledgerwood, RN)

 FHR Baseline Rate :  130 (Gudelia Ledgerwood, RN)

 FHR Baseline Changes:  No Baseline Change (Gudelia Ledgerwood, RN)

 Variability:  Moderate 6-25 bpm (Gudelia Ledgerwood, RN)

 Accelerations:  15X15 (Gudelia Ledgerwood, RN)

 Decelerations:  None (Gudelia Ledgerwood, RN)

   

=================================================================

Datetime: 03/30/2017 06:30

=================================================================

   

   

=================================================================

Uterine Activity

=================================================================

   

 Monitor Mode:  External (Gudelia Ledgerwood, RN)

 Frequency (min):  1.5-3 (Gudelia Ledgerwood, RN)

 Quality:  Mild/Moderate (Gudelia Ledgerwood, RN)

 Duration (sec):  40-80 (Gudelia Ledgerwood, RN)

 Duration Criteria:  Less than Two 120 Second Contractions (Gudelia

   Ledgerwood, RN)

 Pattern:  Normal: <= 5 Contractions in 10 Minutes (Gudelia Ledgerwood, RN)

 Resting Tone (Palpate):  Relaxed (Gudelia Ledgerwood, RN)

   

=================================================================

Fetal Assessment A

=================================================================

   

 Monitor Mode:  External US (Gudelia Ledgerwood, RN)

 FHR Baseline Rate :  135 (Gudelia Ledgerwood, RN)

 FHR Baseline Changes:  No Baseline Change (Gudelia Ledgerwood, RN)

 Variability:  Moderate 6-25 bpm (Gudelia Ledgerwood, RN)

 Accelerations:  15X15 (Gudelia Ledgerwood, RN)

 Decelerations:  None (Gudelia Ledgerwood, RN)

   

=================================================================

Datetime: 03/30/2017 06:29

=================================================================

   

 NBP Sys/Rebecca/Mean (mmHg):  122 (QS system process)

:  62 (QS system process)

:  84 (QS system process)

 Pulse:  85 (QS system process)

 LaborFlag:  Labor (QS system process)

   

=================================================================

Datetime: 03/30/2017 06:00

=================================================================

   

   

=================================================================

Uterine Activity

=================================================================

   

 Monitor Mode:  External (Gudelia Ledgerwood, RN)

 Frequency (min):  1.5-4.5 (Gudelia Ledgerwood, RN)

 Quality:  Mild/Moderate (Gudelia Ledgerwood, RN)

 Duration (sec):  60-80 (Gudelia Ledgerwood, RN)

 Duration Criteria:  Less than Two 120 Second Contractions (Gudelia

   Ledgerwood, RN)

 Pattern:  Normal: <= 5 Contractions in 10 Minutes (Gudelia Ledgerwood, RN)

 Resting Tone (Palpate):  Relaxed (Gudelia Ledgerwood, RN)

   

=================================================================

Fetal Assessment A

=================================================================

   

 Monitor Mode:  External US (Gudelia Ledgerwood, RN)

 FHR Baseline Rate :  135 (Gudelia Ledgerwood, RN)

 FHR Baseline Changes:  No Baseline Change (Gudelia Ledgerwood, RN)

 Variability:  Moderate 6-25 bpm (Gudelia Ledgerwood, RN)

 Accelerations:  15X15 (Gudelia Ledgerwood, RN)

 Decelerations:  None (Gudelia Ledgerwood, RN)

   

=================================================================

Datetime: 03/30/2017 05:51

=================================================================

   

   

=================================================================

Medications

=================================================================

   

 Cervical Ripening Agents:  Cytotec @ 0.05 PO/0.025 PV (Gudelia

   Ledgerwood, RN)

   

=================================================================

Datetime: 03/30/2017 05:48

=================================================================

   

 NBP Sys/Rebecca/Mean (mmHg):  110 (QS system process)

:  57 (QS system process)

:  80 (QS system process)

 Pulse:  88 (QS system process)

 Respirations:  14 (Gudelia Ledgerwood, RN)

 LaborFlag:  Labor (QS system process)

   

=================================================================

Datetime: 03/30/2017 05:30

=================================================================

   

   

=================================================================

Uterine Activity

=================================================================

   

 Monitor Mode:  External (Gudelia Ledgerwood, RN)

 Frequency (min):  1.5-3.5 (Gudelia Ledgerwood, RN)

 Quality:  Mild/Moderate (Gudelia Ledgerwood, RN)

 Duration (sec):  70-90 (Gudelia Ledgerwood, RN)

 Duration Criteria:  Less than Two 120 Second Contractions (Gudelia

   Ledgerwood, RN)

 Pattern:  Normal: <= 5 Contractions in 10 Minutes (Gudelia Ledgerwood, RN)

 Resting Tone (Palpate):  Relaxed (Gudelia Ledgerwood, RN)

   

=================================================================

Fetal Assessment A

=================================================================

   

 Monitor Mode:  External US (Gudelia Ledgerwood, RN)

 FHR Baseline Rate :  130 (Gudelia Ledgerwood, RN)

 FHR Baseline Changes:  No Baseline Change (Gudelia Ledgerwood, RN)

 Variability:  Moderate 6-25 bpm (Gudelia Ledgerwood, RN)

 Accelerations:  15X15 (Gudelia Ledgerwood, RN)

 Decelerations:  None (Gudelia Ledgerwood, RN)

   

=================================================================

Datetime: 03/30/2017 05:23

=================================================================

   

   

=================================================================

Communication

=================================================================

   

 Communication:  Provider Orders Received; Call/Page Placed to Provider

   (Gudelia Ledgerwood, RN)

 Communication Comments:  Dr Christopher was notified over the phone of

   pt's VE. Order received for cytotec 0.05 mg PO and  0.025 PV (Gudelia

   Ledgerwood, RN)

   

=================================================================

Datetime: 03/30/2017 05:19

=================================================================

   

   

=================================================================

Vaginal Exam

=================================================================

   

 Dilatation (cm):  2.0 (Gudelia Ledgerwood, RN)

 Effacement (%):  60 (Gudelia Ledgerwood, RN)

 Station:  -2 (Gudelia Ledgerwood, RN)

 Exam by:  O Ledgerwood RN (Gudelia Ledgerwood, RN)

   

=================================================================

Datetime: 03/30/2017 05:08

=================================================================

   

 NBP Sys/Rebecca/Mean (mmHg):  128 (QS system process)

:  91 (QS system process)

:  104 (QS system process)

 Pulse:  100 (QS system process)

 LaborFlag:  Labor (QS system process)

   

=================================================================

Datetime: 03/30/2017 05:00

=================================================================

   

   

=================================================================

Uterine Activity

=================================================================

   

 Monitor Mode:  External (Gudelia Ledgerwood, RN)

 Frequency (min):  1.5-5 (Gudelia Ledgerwood, RN)

 Quality:  Mild/Moderate (Gudelia Ledgerwood, RN)

 Duration (sec):  50-80 (Gudelia Ledgerwood, RN)

 Duration Criteria:  Less than Two 120 Second Contractions (Gudelia

   Ledgerwood, RN)

 Pattern:  Normal: <= 5 Contractions in 10 Minutes (Gudelia Ledgerwood, RN)

 Resting Tone (Palpate):  Relaxed (Gudelia Ledgerwood, RN)

   

=================================================================

Fetal Assessment A

=================================================================

   

 Monitor Mode:  External US (Gudelia Ledgerwood, RN)

 FHR Baseline Rate :  130 (Gudelia Ledgerwood, RN)

 FHR Baseline Changes:  No Baseline Change (Gudelia Ledgerwood, RN)

 Variability:  Moderate 6-25 bpm (Gudelia Ledgerwood, RN)

 Accelerations:  15X15 (Gudelia Ledgerwood, RN)

 Decelerations:  None (Gudelia Ledgerwood, RN)

   

=================================================================

Datetime: 03/30/2017 04:30

=================================================================

   

   

=================================================================

Uterine Activity

=================================================================

   

 Monitor Mode:  External (Gudelia Ledgerwood, RN)

 Frequency (min):  1.5-4.5 (Gudelia Ledgerwood, RN)

 Quality:  Mild (Gudelia Ledgerwood, RN)

 Duration (sec):  50-80 (Gudelia Ledgerwood, RN)

 Duration Criteria:  Less than Two 120 Second Contractions (Gudelia

   Ledgerwood, RN)

 Pattern:  Normal: <= 5 Contractions in 10 Minutes (Gudelia Ledgerwood, RN)

 Resting Tone (Palpate):  Relaxed (Gudelia Ledgerwood, RN)

   

=================================================================

Fetal Assessment A

=================================================================

   

 Monitor Mode:  External US (Gudelia Ledgerwood, RN)

 FHR Baseline Rate :  125 (Gudelia Ledgerwood, RN)

 FHR Baseline Changes:  No Baseline Change (Gudelia Ledgerwood, RN)

 Variability:  Moderate 6-25 bpm (Gudelia Ledgerwood, RN)

 Accelerations:  None (Gudelia Ledgerwood, RN)

 Decelerations:  None (Gudelia Ledgerwood, RN)

   

=================================================================

Datetime: 03/30/2017 04:28

=================================================================

   

 NBP Sys/Rebecca/Mean (mmHg):  128 (QS system process)

:  91 (QS system process)

:  106 (QS system process)

 Pulse:  107 (QS system process)

 LaborFlag:  Labor (QS system process)

   

=================================================================

Datetime: 03/30/2017 04:00

=================================================================

   

   

=================================================================

Uterine Activity

=================================================================

   

 Monitor Mode:  External (Gudelia Ledgerwood, RN)

 Frequency (min):  1.5-5 (Gudelia Ledgerwood, RN)

 Quality:  Mild (Gudelia Ledgerwood, RN)

 Duration (sec):  50-80 (Gudelia Ledgerwood, RN)

 Duration Criteria:  Less than Two 120 Second Contractions (Gudelia

   Ledgerwood, RN)

 Pattern:  Normal: <= 5 Contractions in 10 Minutes (Gudelia Ledgerwood, RN)

 Resting Tone (Palpate):  Relaxed (Gudelia Ledgerwood, RN)

   

=================================================================

Fetal Assessment A

=================================================================

   

 Monitor Mode:  External US (Gudelia Ledgerwood, RN)

 FHR Baseline Rate :  125 (Gudelia Ledgerwood, RN)

 FHR Baseline Changes:  No Baseline Change (Gudelia Ledgerwood, RN)

 Variability:  Moderate 6-25 bpm (Gudelia Ledgerwood, RN)

 Accelerations:  15X15 (Gudelia Ledgerwood, RN)

 Decelerations:  None (Gudelia Ledgerwood, RN)

   

=================================================================

Datetime: 03/30/2017 03:48

=================================================================

   

 NBP Sys/Rebecca/Mean (mmHg):  123 (QS system process)

:  73 (QS system process)

:  95 (QS system process)

 Pulse:  94 (QS system process)

 LaborFlag:  Labor (QS system process)

   

=================================================================

Datetime: 03/30/2017 03:30

=================================================================

   

   

=================================================================

Uterine Activity

=================================================================

   

 Monitor Mode:  External (Gudeliashelley Trujillo, RN)

 Frequency (min):  2.5-4.5 (Gudeliashelley Trujillo, RN)

 Quality:  Mild (Gudelia Ledgerwood, RN)

 Duration (sec):  70-80 (Gudelia Ledgerwood, RN)

 Duration Criteria:  Less than Two 120 Second Contractions (Gudelia

   Ledgerwood, RN)

 Pattern:  Normal: <= 5 Contractions in 10 Minutes (Gudelia Naheedgerwood, RN)

 Resting Tone (Palpate):  Relaxed (Gudelia Naheedgerwood, RN)

   

=================================================================

Fetal Assessment A

=================================================================

   

 Monitor Mode:  External US (Gudelia Ledgerwood, RN)

 FHR Baseline Rate :  135 (Gudelia Ledgerwood, RN)

 FHR Baseline Changes:  No Baseline Change (Gudelia Ledgerwood, RN)

 Variability:  Moderate 6-25 bpm (Gudelia Ledgerwood, RN)

 Accelerations:  15X15 (Gudelia Ledgerwood, RN)

 Decelerations:  None (Gudelia Ledgerwood, RN)

   

=================================================================

Datetime: 03/30/2017 03:00

=================================================================

   

   

=================================================================

Uterine Activity

=================================================================

   

 Monitor Mode:  External (Gudelia Ledgerwood, RN)

 Frequency (min):  1.5-4.5 (Gudelia Ledgerwood, RN)

 Quality:  Mild (Gudelia Ledgerwood, RN)

 Duration (sec):  50-80 (Gudelia Ledgerwood, RN)

 Duration Criteria:  Less than Two 120 Second Contractions (Gudelia

   Ledgerwood, RN)

 Pattern:  Normal: <= 5 Contractions in 10 Minutes (Gudelia Ledgerwood, RN)

 Resting Tone (Palpate):  Relaxed (Gudelia Ledgerwood, RN)

   

=================================================================

Fetal Assessment A

=================================================================

   

 Monitor Mode:  External US (Gudelia Ledgerwood, RN)

 FHR Baseline Rate :  130 (Gudelia Ledgerwood, RN)

 FHR Baseline Changes:  No Baseline Change (Gudelia Ledgerwood, RN)

 Variability:  Moderate 6-25 bpm (Gudelia Ledgerwood, RN)

 Accelerations:  15X15 (Gudelia Ledgerwood, RN)

 Decelerations:  None (Gudelia Ledgerwood, RN)

   

=================================================================

Datetime: 03/30/2017 02:30

=================================================================

   

   

=================================================================

Uterine Activity

=================================================================

   

 Monitor Mode:  External (Gudelia Ledgerwood, RN)

 Frequency (min):  1-3 (Gudelia Ledgerwood, RN)

 Quality:  Mild/Moderate (Gudelia Ledgerwood, RN)

 Duration (sec):  60-80 (Gudelia Ledgerwood, RN)

 Duration Criteria:  Less than Two 120 Second Contractions (Gudelia

   Ledgerwood, RN)

 Pattern:  Normal: <= 5 Contractions in 10 Minutes (Gudelia Ledgerwood, RN)

 Resting Tone (Palpate):  Relaxed (Gudelia Ledgerwood, RN)

   

=================================================================

Fetal Assessment A

=================================================================

   

 Monitor Mode:  External US (Gudelia Ledgerwood, RN)

 FHR Baseline Rate :  135 (Gudelia Ledgerwood, RN)

 FHR Baseline Changes:  No Baseline Change (Gudelia Ledgerwood, RN)

 Variability:  Moderate 6-25 bpm (Gudelia Ledgerwood, RN)

 Accelerations:  15X15 (Gudelia Ledgerwood, RN)

 Decelerations:  None (Gudelia Ledgerwood, RN)

   

=================================================================

Datetime: 03/30/2017 02:00

=================================================================

   

   

=================================================================

Uterine Activity

=================================================================

   

 Monitor Mode:  External (Gudelia Ledgerwood, RN)

 Frequency (min):  1-4.5 (Gudelia Ledgerwood, RN)

 Quality:  Mild/Moderate (Gudelia Ledgerwood, RN)

 Duration (sec):  40-70 (Gudelia Ledgerwood, RN)

 Duration Criteria:  Less than Two 120 Second Contractions (Gudelia

   Ledgerwood, RN)

 Pattern:  Normal: <= 5 Contractions in 10 Minutes (Gudelia Ledgerwood, RN)

 Resting Tone (Palpate):  Relaxed (Gudelia Ledgerwood, RN)

   

=================================================================

Fetal Assessment A

=================================================================

   

 Monitor Mode:  External US (Gudelia Ledgerwood, RN)

 FHR Baseline Rate :  135 (Gudelia Ledgerwood, RN)

 FHR Baseline Changes:  No Baseline Change (Gudelia Ledgerwood, RN)

 Variability:  Moderate 6-25 bpm (Gudelia Ledgerwood, RN)

 Accelerations:  15X15 (Gudelia Ledgerwood, RN)

 Decelerations:  None (Gudelia Ledgerwood, RN)

   

=================================================================

Datetime: 03/30/2017 01:30

=================================================================

   

   

=================================================================

Uterine Activity

=================================================================

   

 Monitor Mode:  External (Gudelia Ledgerwood, RN)

 Frequency (min):  2.5-3.5 (Gudelia Ledgerwood, RN)

 Quality:  Mild (Gudelia Ledgerwood, RN)

 Duration (sec):  60-80 (Gudelia Ledgerwood, RN)

 Duration Criteria:  Less than Two 120 Second Contractions (Gudelia

   Ledgerwood, RN)

 Pattern:  Normal: <= 5 Contractions in 10 Minutes (Gudelia Ledgerwood, RN)

 Resting Tone (Palpate):  Relaxed (Gudelia Ledgerwood, RN)

   

=================================================================

Fetal Assessment A

=================================================================

   

 Monitor Mode:  External US (Gudelia Ledgerwood, RN)

 FHR Baseline Rate :  130 (Gudelia Ledgerwood, RN)

 FHR Baseline Changes:  No Baseline Change (Gudelia Ledgerwood, RN)

 Variability:  Moderate 6-25 bpm (Gudelia Ledgerwood, RN)

 Accelerations:  15X15 (Gudelia Ledgerwood, RN)

 Decelerations:  None (Gudelia Ledgerwood, RN)

   

=================================================================

Datetime: 03/30/2017 01:21

=================================================================

   

   

=================================================================

Medications

=================================================================

   

 Cervical Ripening Agents:  Cytotec @ 0.050 PO/ 0.025 PV (Gudelia

   Ledgerwood, RN)

   

=================================================================

Datetime: 03/30/2017 01:08

=================================================================

   

 NBP Sys/Rebecca/Mean (mmHg):  149 (QS system process)

:  78 (QS system process)

:  105 (QS system process)

 Pulse:  100 (QS system process)

 Respirations:  14 (Gudelia Naheedgerwood, RN)

 LaborFlag:  Labor (QS system process)

   

=================================================================

Datetime: 03/30/2017 01:00

=================================================================

   

   

=================================================================

Uterine Activity

=================================================================

   

 Monitor Mode:  External; Palpation (Gudelia Naheedgerwood, RN)

 Frequency (min):  2.5-3 (Gudelia Ledgerwood, RN)

 Quality:  Mild (Gudelia Ledgerwood, RN)

 Duration (sec):  70-80 (Gudelia Ledgerwood, RN)

 Duration Criteria:  Less than Two 120 Second Contractions (Gudelia

   Ledgerwood, RN)

 Pattern:  Normal: <= 5 Contractions in 10 Minutes (Gudelia Naheedgerwood, RN)

 Resting Tone (Palpate):  Relaxed (Gudelia Ledgerwood, RN)

   

=================================================================

Fetal Assessment A

=================================================================

   

 Monitor Mode:  External US (Gudelia Ledgerwood, RN)

 FHR Baseline Rate :  130 (Gudelia Ledgerwood, RN)

 FHR Baseline Changes:  No Baseline Change (Gudelia Ledgerwood, RN)

 Variability:  Moderate 6-25 bpm (Gudelia Ledgerwood, RN)

 Accelerations:  15X15 (Gudelia Ledgerwood, RN)

 Decelerations:  None (Gudelia Ledgerwood, RN)

   

=================================================================

Datetime: 03/30/2017 00:30

=================================================================

   

   

=================================================================

Uterine Activity

=================================================================

   

 Monitor Mode:  External (Gudelia Ledgerwood, RN)

 Frequency (min):  2-3.5 (Gudelia Ledgerwood, RN)

 Quality:  Mild (Gudelia Ledgerwood, RN)

 Duration (sec):  70-80 (Gudelia Ledgerwood, RN)

 Duration Criteria:  Less than Two 120 Second Contractions (Gudelia

   Ledgerwood, RN)

 Pattern:  Normal: <= 5 Contractions in 10 Minutes (Gudelia Ledgerwood, RN)

 Resting Tone (Palpate):  Relaxed (Gudelia Ledgerwood, RN)

   

=================================================================

Fetal Assessment A

=================================================================

   

 Monitor Mode:  External US (Gudelia Ledgerwood, RN)

 FHR Baseline Rate :  135 (Gudelia Ledgerwood, RN)

 FHR Baseline Changes:  No Baseline Change (Gudelia Ledgerwood, RN)

 Variability:  Moderate 6-25 bpm (Gudelia Ledgerwood, RN)

 Accelerations:  15X15 (Gudelia Ledgerwood, RN)

 Decelerations:  None (Gudelia Ledgerwood, RN)

   

=================================================================

Datetime: 03/30/2017 00:00

=================================================================

   

   

=================================================================

Uterine Activity

=================================================================

   

 Monitor Mode:  External; Palpation (Gudelia Ledgerwood, RN)

 Frequency (min):  1.5-4 (Gudelia Ledgerwood, RN)

 Quality:  Mild (Gudelia Ledgerwood, RN)

 Duration (sec):  60-80 (Gudelia Ledgerwood, RN)

 Duration Criteria:  Less than Two 120 Second Contractions (Gudelia

   Ledgerwood, RN)

 Pattern:  Normal: <= 5 Contractions in 10 Minutes (Gudelia Ledgerwood, RN)

 Resting Tone (Palpate):  Relaxed (Gudelia Ledgerwood, RN)

   

=================================================================

Fetal Assessment A

=================================================================

   

 Monitor Mode:  External US (Gudelia Ledgerwood, RN)

 FHR Baseline Rate :  130 (Gudelia Ledgerwood, RN)

 FHR Baseline Changes:  No Baseline Change (Gudelia Ledgerwood, RN)

 Variability:  Moderate 6-25 bpm (Gudelia Ledgerwood, RN)

 Accelerations:  15X15 (Gudelia Ledgerwood, RN)

 Decelerations:  None (Gudelia Ledgerwood, RN)

   

=================================================================

Datetime: 03/29/2017 23:50

=================================================================

   

   

=================================================================

Pain

=================================================================

   

 Pain Scale:  1 ([facility], RN)

 Pain Presence:  Intermittent ([facility], RN)

 Pain Type:  Contraction ([facility], )

 Pain Location:  Abdomen ([facility], RN)

 Pain Coping:  Talking Through Contractions ([facility], RN)

   

=================================================================

Maternal Assessment

=================================================================

   

 Level of Consciousness:  Fully Conscious ([facility], RN)

 DTR's/Clonus:  DTRs 2+; No Clonus ([facility], )

 Headache:  Denies ([facility], RN)

 Breath Sounds, Left:  Clear and Equal (Gudelia Trujillo RN)

 Breath Sounds, Right:  Clear and Equal (Gudelia Trujillo RN)

 Nausea/Vomiting:  Denies (Gudelia Trujillo RN)

 RUQ Epigastric Pain:  Denies (Gudelia Turjillo RN)

   

=================================================================

Teaching

=================================================================

   

 Instructional Method:  Demo; Verbal; Patient Instructed (Gudelia Trujillo RN)

 Plan of Care:  Plan of Care Discussed (Gudelia Trujillo RN)

 Unit Routine:  [place] to Room; Call [place]; Bed; Visiting Policy;

   Handwashing; Fetal Monitoring; Safety/Fall Risk Prevention (Gudelia Trujillo RN)

 Labor/Induction:  Cervical Ripening; Induction (Gudelia Trujillo RN)

 LaborFlag:  Labor (QS system process)

   

=================================================================

Datetime: 03/29/2017 23:39

=================================================================

   

   

=================================================================

Vaginal Exam

=================================================================

   

 Dilatation (cm):  1.5 (Gudelia Ledgerwood, RN)

 Exam by:  O Yuwood RN (Gudelia Ledgerwood, RN)

 Vaginal Exam Comments:  thick, high (Gudelia Ledgerwood, RN)

   

=================================================================

Datetime: 03/29/2017 23:20

=================================================================

   

   

=================================================================

Vital Signs

=================================================================

   

Stage of Pregnancy:  Labor (Gudelia Ledgerwood, RN)

## 2017-03-30 NOTE — L&D FLOW SHEET
=================================================================

LD Flowsheet

=================================================================

Datetime Report Generated by CPN: 03/30/2017 20:00

   

   

=================================================================

Datetime: 03/30/2017 19:52

=================================================================

   

 NBP Sys/Rebecca/Mean (mmHg):  140 (QS system process)

:  74 (QS system process)

:  98 (QS system process)

 Pulse:  107 (QS system process)

 LaborFlag:  Labor (QS system process)

   

=================================================================

Datetime: 03/30/2017 19:50

=================================================================

   

 NBP Sys/Rebecca/Mean (mmHg):  144 (QS system process)

:  94 (QS system process)

:  115 (QS system process)

 LaborFlag:  Labor (QS system process)

   

=================================================================

Datetime: 03/30/2017 19:46

=================================================================

   

 NBP Sys/Rebecca/Mean (mmHg):  140 (Dacia Hernandez)

:  78 (Dacia Hernandez)

 Anesthesia Plans:  Epidural (Dacia Hernandez)

 Epidural Positioning:  Sitting (Dacia Hernandez)

 Epidural Procedure:  Test Dose (Dacia Hernandez)

 LaborFlag:  Labor (QS system process)

   

=================================================================

Datetime: 03/30/2017 19:44

=================================================================

   

 NBP Sys/Rebecca/Mean (mmHg):  139 (Dacia Hernandez)

:  77 (Dacia Hernandez)

 Anesthesia Plans:  Epidural (Dacia Hernandez)

 Epidural Positioning:  Sitting (Dacia Hernandez)

 Epidural Procedure:  Cath Placed (Dacia Hernandez)

 LaborFlag:  Labor (QS system process)

   

=================================================================

Datetime: 03/30/2017 19:43

=================================================================

   

 Anesthesia Plans:  Epidural (Dacia Hernandez)

 Epidural Positioning:  Sitting (Dacia Hernandez)

   

=================================================================

Datetime: 03/30/2017 19:42

=================================================================

   

 NBP Sys/Rebecca/Mean (mmHg):  140 (Dacia Hernandez)

:  76 (Dacia Hernandez)

 LaborFlag:  Labor (QS system process)

   

=================================================================

Datetime: 03/30/2017 19:40

=================================================================

   

 NBP Sys/Rebecca/Mean (mmHg):  138 (Dacia Hernandez)

:  79 (Dacia Hernandez)

 LaborFlag:  Labor (QS system process)

   

=================================================================

Datetime: 03/30/2017 19:38

=================================================================

   

 Anesthesia Comments:  Dr Kathrynkan at the bedside  (Dacia Hernandez)

   

=================================================================

Datetime: 03/30/2017 19:34

=================================================================

   

 NBP Sys/Rebecca/Mean (mmHg):  137 (QS system process)

:  78 (QS system process)

:  100 (QS system process)

 Pulse:  109 (QS system process)

 LaborFlag:  Labor (QS system process)

   

=================================================================

Datetime: 03/30/2017 19:29

=================================================================

   

 Pulse:  108 (QS system process)

 SpO2 (%):  92 (QS system process)

 LaborFlag:  Labor (QS system process)

   

=================================================================

Datetime: 03/30/2017 19:28

=================================================================

   

 Procedure Type:  epidural (Dacia Hernandez)

 Procedure Verify:  Correct Patient Identity; Correct Side and Site are

   Marked; Accurate Procedure Consent Form; Agreement on Procedure to

   be Done; Correct Patient Position; Relevant Images and Results are

   Properly Labeled and Displayed; Safety Precautions Based on Patient

   History or Medication Use (Dacia Hernandez)

 Anesthesia Plans:  Epidural (Dacia Hernandez)

 Epidural Positioning:  Sitting (Dacia Hernandez)

   

=================================================================

Datetime: 03/30/2017 19:22

=================================================================

   

 Anesthesia Comments:  patient sitting for epidural (Dacia Hernandez)

   

=================================================================

Datetime: 03/30/2017 19:19

=================================================================

   

 NBP Sys/Rebecca/Mean (mmHg):  148 (QS system process)

:  86 (QS system process)

:  110 (QS system process)

 Pulse:  115 (QS system process)

 LaborFlag:  Labor (QS system process)

   

=================================================================

Datetime: 03/30/2017 19:18

=================================================================

   

 Communication Comments:  Report given to AMary Hernandez RN (Shelley Rosenthal, RN)

   

=================================================================

Datetime: 03/30/2017 19:16

=================================================================

   

 NBP Sys/Rebecca/Mean (mmHg):  147 (QS system process)

:  76 (QS system process)

:  103 (QS system process)

 Pulse:  106 (QS system process)

 LaborFlag:  Labor (QS system process)

   

=================================================================

Datetime: 03/30/2017 19:15

=================================================================

   

 Monitor Mode:  External; Palpation (Shelley Rosenthal RN)

 Frequency (min):  1-4 (Shelley Rosenthal RN)

 Quality:  Moderate (Shelley Rosenthal RN)

 Duration (sec):  50-80 (Shelley Rosenthal RN)

 Resting Tone (Palpate):  Relaxed (Shelley Rosenthal RN)

 Monitor Mode:  External US (Shelley Rosenthal RN)

 FHR Baseline Rate :  130 (Shelley Rosenthal RN)

 Variability:  Moderate 6-25 bpm (Shelley Rosenthal RN)

 Accelerations:  15X15 (Shelley Rosenthal RN)

 Decelerations:  None (Shelley Rosenthal RN)

 Pitocin (milliunit):  Pitocin Remains (milliunits) @ 20 (Shelley Rosenthal RN)

 Procedure Type:  epidural (Dacia Hernandez)

 Procedure Verify:  Correct Patient Identity; Correct Side and Site are

   Marked; Accurate Procedure Consent Form; Agreement on Procedure to

   be Done; Correct Patient Position; Relevant Images and Results are

   Properly Labeled and Displayed; Safety Precautions Based on Patient

   History or Medication Use (Dacia Hernandez)

   

=================================================================

Datetime: 03/30/2017 19:13

=================================================================

   

 Monitor Interventions for FHR:  Ultrasound Adjusted (Shelley Rosenthal RN)

 Communication:  RN at Bedside (Shelley Rosenthal RN)

   

=================================================================

Datetime: 03/30/2017 19:11

=================================================================

   

 Patient Care Comments:  IV infiltrated. New IV started in L Forearm

   18gauge (Shelley Rosenthal RN)

   

=================================================================

Datetime: 03/30/2017 19:05

=================================================================

   

 NBP Sys/Rebecca/Mean (mmHg):  155 (QS system process)

:  105 (QS system process)

:  122 (QS system process)

 Pulse:  110 (QS system process)

 LaborFlag:  Labor (QS system process)

   

=================================================================

Datetime: 03/30/2017 19:00

=================================================================

   

 Monitor Mode:  External; Palpation (Shelley Rosenthal RN)

 Frequency (min):  1-4 (Shelley Rosenthal RN)

 Quality:  Moderate (Shelley Rosenthal RN)

 Duration (sec):  50-80 (hSelley Rosenthal RN)

 Resting Tone (Palpate):  Relaxed (Shelley Rosenthal RN)

 Monitor Mode:  External US (Shelley Rosenthal RN)

 FHR Baseline Rate :  125 (Shelley Rosenthal RN)

 Variability:  Moderate 6-25 bpm (Shelley Rosenthal RN)

 Accelerations:  15X15 (Shelley Rosenthal RN)

 Decelerations:  None (Shelley Rosenthal RN)

 Pitocin (milliunit):  Pitocin Remains (milliunits) @ 20 (Shelley Rosenthal RN)

 Procedure Type:  epidural (Daciamarlene Hernandez)

 Procedure Verify:  Correct Patient Identity; Correct Side and Site are

   Marked; Accurate Procedure Consent Form; Agreement on Procedure to

   be Done; Correct Patient Position; Relevant Images and Results are

   Properly Labeled and Displayed; Safety Precautions Based on Patient

   History or Medication Use (Daciaamrlene Hernandez)

   

=================================================================

Datetime: 03/30/2017 18:56

=================================================================

   

 Bedside Blood Glucose:  65 L (Annotations: MD Notified) (QS system

   process)

 LaborFlag:  Labor (QS system process)

   

=================================================================

Datetime: 03/30/2017 18:49

=================================================================

   

 NBP Sys/Rebecca/Mean (mmHg):  141 (QS system process)

:  79 (QS system process)

:  102 (QS system process)

 Pulse:  103 (QS system process)

 LaborFlag:  Labor (QS system process)

   

=================================================================

Datetime: 03/30/2017 18:45

=================================================================

   

 Monitor Mode:  External (Shelley Rosenthal RN)

 Frequency (min):  1-3 (Shelley Rosenthal RN)

 Quality:  Mild/Moderate (Shelley Rosenthal RN)

 Duration (sec):  50-80 (Shelley Rosenthal RN)

 Resting Tone (Palpate):  Relaxed (Shelley Rosenthal RN)

 Monitor Mode:  External US (Shelley Rosenthal RN)

 FHR Baseline Rate :  120 (Shelley Rosenthal RN)

 Variability:  Moderate 6-25 bpm (Shelley Rosenthal RN)

 Accelerations:  15X15 (Shelley Rosenthal RN)

 Decelerations:  None (Shelley Rosenthal RN)

 Pitocin (milliunit):  Pitocin Remains (milliunits) @ 20 (Shelley Rosenthal RN)

   

=================================================================

Datetime: 03/30/2017 18:39

=================================================================

   

 Monitor Interventions for UA:  [place] Adjusted (Shelley Rosenthal RN)

 Dilatation (cm):  5.5 (Shelley Rosenthal RN)

 Effacement (%):  80 (Shelley Rosenthal RN)

 Station:  -1 (Shelley Rosenthal RN)

 Exam by:  CHERYL Baker RNC (Shelley Rosenthal RN)

   

=================================================================

Datetime: 03/30/2017 18:34

=================================================================

   

 NBP Sys/Rebecca/Mean (mmHg):  137 (QS system process)

:  99 (QS system process)

:  114 (QS system process)

 Pulse:  100 (QS system process)

 LaborFlag:  Labor (QS system process)

   

=================================================================

Datetime: 03/30/2017 18:30

=================================================================

   

 Monitor Mode:  External (Shelley Rosenthal RN)

 Frequency (min):  1-4 (Shelley Rosenthal RN)

 Quality:  Mild/Moderate (Shelley Rosenthal RN)

 Duration (sec):  50-80 (Shelley Rosenthal RN)

 Resting Tone (Palpate):  Relaxed (Shelley Rosnethal RN)

 Monitor Mode:  External US (Shelley Rosenthal RN)

 FHR Baseline Rate :  125 (Shelley Rosenthal RN)

 Variability:  Moderate 6-25 bpm (Shelley Rosenthal RN)

 Accelerations:  15X15 (Shelley Rosenthal RN)

 Decelerations:  None (Shelley Rosenthal RN)

 Pitocin (milliunit):  Pitocin Remains (milliunits) @ 20 (Shelley Rosenthal RN)

   

=================================================================

Datetime: 03/30/2017 18:16

=================================================================

   

 Pain Coping:  Requesting Pain Medication or Epidural (Shelley Rosenthal RN)

 IV/Blood Work:  IV [place]us Started (Shelley Rosenthal RN)

 Procedure Verify:  Correct Patient Identity; Correct Side and Site are

   Marked; Accurate Procedure Consent Form; Agreement on Procedure to

   be Done (Shelley Rosenthal RN)

 Anesthesia Plans:  Epidural (Shelley Rosenthal RN)

   

=================================================================

Datetime: 03/30/2017 18:15

=================================================================

   

 Monitor Mode:  External; Palpation (Shelley Rosenthal RN)

 Quality:  Mild/Moderate (Shelley Rosenthal RN)

 Resting Tone (Palpate):  Relaxed (Shelley Rosenthal RN)

 Contraction Comments:  contractions not tracing on monitor (Shelley Rosenthal RN)

 Monitor Mode:  External US (Shelley Rosenthal RN)

 FHR Baseline Rate :  135 (Shelley Rosenthal RN)

 Variability:  Moderate 6-25 bpm (Shelley Rosenthal RN)

 Accelerations:  10X10 (Shelley Rosenthal RN)

 Decelerations:  None (Shelley Rosenthal RN)

 Pitocin (milliunit):  Pitocin Remains (milliunits) @ 20 (Shelley Rosenthal RN)

   

=================================================================

Datetime: 03/30/2017 18:14

=================================================================

   

 Dilatation (cm):  3.0 (Shelley Rosenthal, RN)

 Effacement (%):  70 (Shelley Rosenthal, RN)

 Station:  -1 (Shelley Rosenthal, RN)

 Exam by:  JORGITO Rosenthal RN (Shelley Rosenthal RN)

   

=================================================================

Datetime: 03/30/2017 18:10

=================================================================

   

 Monitor Interventions for UA:  [place] Adjusted (Shelley Rosenthal, RN)

   

=================================================================

Datetime: 03/30/2017 18:01

=================================================================

   

 Pitocin (milliunit):  Pitocin Remains (milliunits) @ 20 (Shelley Rosenthal,

   RN)

   

=================================================================

Datetime: 03/30/2017 18:00

=================================================================

   

 Monitor Mode:  External (Shelley Rosenthal, RN)

 Frequency (min):  1-3 (Shelley Rosenthal, RN)

 Quality:  Mild/Moderate (Shelley Rosenthal, RN)

 Duration (sec):  50-80 (Shelley Rosenthal, RN)

 Resting Tone (Palpate):  Relaxed (Shelley Rosenthal, RN)

 Monitor Mode:  External US (Shelley Rosenthal, RN)

 FHR Baseline Rate :  135 (Shelley Rosenthal, RN)

 Variability:  Moderate 6-25 bpm (Shelley Rosenthal, RN)

 Accelerations:  10X10 (Shelley Rosenthal, RN)

 Decelerations:  None (Shelley Rosenthal, RN)

   

=================================================================

Datetime: 03/30/2017 17:55

=================================================================

   

 Monitor Interventions for UA:  [place] Adjusted (Shelley Rosenthal, RN)

   

=================================================================

Datetime: 03/30/2017 17:54

=================================================================

   

 Monitor Interventions for FHR:  Ultrasound Adjusted (Shelley Rosenthal RN)

 Communication:  RN at Bedside (Shelley Rosenthal RN)

   

=================================================================

Datetime: 03/30/2017 17:45

=================================================================

   

 Monitor Mode:  External; Palpation (Shelley Rosenthal RN)

 Frequency (min):  1-3 (Shelley Rosenthal RN)

 Quality:  Mild/Moderate (Shelley Rosenthal RN)

 Duration (sec):  50-80 (Shelley Rosenthal, RN)

 Resting Tone (Palpate):  Relaxed (Shelley Rosenthal RN)

 Monitor Mode:  External US (Shelley Rosenthal RN)

 FHR Baseline Rate :  125 (Shelley Rosenthal RN)

 Variability:  Moderate 6-25 bpm (Shelley Rosenthal, RN)

 Accelerations:  15X15 (Shelley Rosenthal, RN)

 Decelerations:  None (Shelley Rosenthal, RN)

 Pain Coping:  Breathing Through Contractions (Shelley Rosenthal RN)

 Pain Assessment Comments:  Pt asking about different pain control.

   Denies need for pain medicine or epidural at this time. Offered warm

   compress, position changes. FOB at bedside.  (Shelley Rosenthal RN)

 Pitocin (milliunit):  Pitocin Remains (milliunits) @ 20 (Shelley Rosenthal

   RN)

 Comfort Measures:  Breathing/Relaxation; Coaching; Hot/Cold Pack;

   Family Support (Shelley Rosenthal RN)

 LaborFlag:  Labor (QS system process)

   

=================================================================

Datetime: 03/30/2017 17:35

=================================================================

   

 NBP Sys/Rebecca/Mean (mmHg):  141 (QS system process)

:  77 (QS system process)

:  103 (QS system process)

 Pulse:  104 (QS system process)

 LaborFlag:  Labor (QS system process)

   

=================================================================

Datetime: 03/30/2017 17:30

=================================================================

   

 Monitor Mode:  External; Palpation (Shelley Rosenthal RN)

 Frequency (min):  1-5 (Shelley Rosenthal RN)

 Quality:  Mild/Moderate (Shelley Rosenthal RN)

 Duration (sec):  50-80 (Shelley Rosenthal RN)

 Resting Tone (Palpate):  Relaxed (Shelley Rosenthal RN)

 Monitor Mode:  External US (Shelley Rosenthal RN)

 FHR Baseline Rate :  135 (Shelley Rosenthal RN)

 Variability:  Moderate 6-25 bpm (Shelley Rosenthal RN)

 Accelerations:  15X15 (Shelley Rosenthal RN)

 Decelerations:  None (Shelley Rosenthal RN)

 Pitocin (milliunit):  Pitocin Remains (milliunits) @ 20 (Shelley Rosenthal RN)

   

=================================================================

Datetime: 03/30/2017 17:19

=================================================================

   

 NBP Sys/Rebecca/Mean (mmHg):  132 (QS system process)

:  73 (QS system process)

:  97 (QS system process)

 Pulse:  90 (QS system process)

 LaborFlag:  Labor (QS system process)

   

=================================================================

Datetime: 03/30/2017 17:15

=================================================================

   

 Monitor Mode:  External (Shelley Rosenthal RN)

 Frequency (min):  1-3 (Shelley Rosenthal RN)

 Quality:  Mild (Shelley Rosenthal RN)

 Duration (sec):  50-80 (Shelley Rosenthal RN)

 Resting Tone (Palpate):  Relaxed (Shelley Rosenthal RN)

 Monitor Mode:  External US (Shelley Rosenthal RN)

 FHR Baseline Rate :  135 (Shelley Rosenthal RN)

 Variability:  Moderate 6-25 bpm (Shelley Rosenthal RN)

 Accelerations:  15X15 (Shelley Rosenthal RN)

 Decelerations:  None (Shelley Rosenthal RN)

 Pitocin (milliunit):  Pitocin Remains (milliunits) @ 20 (Shelley Rosenthal RN)

   

=================================================================

Datetime: 03/30/2017 17:09

=================================================================

   

 Respirations:  16 (Shelley Rosenthal RN)

 Temperature (F):  98.6 (Shelley Rosenthal RN)

 Temperature (C):  37.0 (QS system process)

 Pain Scale:  4 (Shelley Rosenthal RN)

 Pain Presence:  Intermittent (Shelley Rosenthal RN)

 Pain Type:  Contraction (Shelley Rosenthal RN)

 Pain Location:  Abdomen (Shelley Rosenthal RN)

 Pain Relief Measures:  Comfort Measures (Shelley Rosenthal RN)

 Pain Coping:  Talking Through Contractions (Shelley Rosenthal RN)

 LaborFlag:  Labor (QS system process)

   

=================================================================

Datetime: 03/30/2017 17:04

=================================================================

   

 NBP Sys/Rebecca/Mean (mmHg):  135 (QS system process)

:  77 (QS system process)

:  100 (QS system process)

 Pulse:  95 (QS system process)

 LaborFlag:  Labor (QS system process)

   

=================================================================

Datetime: 03/30/2017 17:00

=================================================================

   

 Monitor Mode:  External (Shelley Rosenthal, RN)

 Frequency (min):  1-3 (Shelley Rosenthal, RN)

 Quality:  Mild (Shelley Galo, RN)

 Duration (sec):  50-80 (Shelley Galo, RN)

 Resting Tone (Palpate):  Relaxed (Shelley Rosenthal, RN)

 Monitor Mode:  External US (Shelley Rosenthal, RN)

 FHR Baseline Rate :  125 (Shelley Galo, RN)

 Variability:  Moderate 6-25 bpm (Shelley Rosenthal, RN)

 Accelerations:  15X15 (Shelley Galo, RN)

 Decelerations:  None (Shelleysawyer Rosenthal, RN)

 Pitocin (milliunit):  Pitocin Remains (milliunits) @ 20 (Shelley Galo,

   RN)

   

=================================================================

Datetime: 03/30/2017 16:50

=================================================================

   

 NBP Sys/Rebecca/Mean (mmHg):  134 (QS system process)

:  77 (QS system process)

:  99 (QS system process)

 Pulse:  96 (QS system process)

 LaborFlag:  Labor (QS system process)

   

=================================================================

Datetime: 03/30/2017 16:45

=================================================================

   

 Monitor Mode:  External (Shelley Rosenthal, RN)

 Frequency (min):  1-5 (Shelley Rosenthal RN)

 Quality:  Mild (Shelley Rosenthal, RN)

 Duration (sec):  50-80 (Shelley Rosenthal, RN)

 Duration Criteria:  Less than Two 120 Second Contractions (Shelley Rosenthal, RN)

 Pattern:  Normal: <= 5 Contractions in 10 Minutes (Shelley Rosenthal RN)

 Resting Tone (Palpate):  Relaxed (Shelley Rosenthal, RN)

 Monitor Mode:  External US (Shelley Rosenthal, RN)

 FHR Baseline Rate :  125 (Shelley Rosenthal, RN)

 Variability:  Moderate 6-25 bpm (Shelley Rosenthal, RN)

 Accelerations:  15X15 (Shelley Rosenthal, RN)

 Decelerations:  None (Shelley Rosenthal, RN)

 Pitocin (milliunit):  Pitocin Remains (milliunits) @ 20 (Shelley Rosenthal,

   RN)

   

=================================================================

Datetime: 03/30/2017 16:34

=================================================================

   

 NBP Sys/Rebecca/Mean (mmHg):  120 (QS system process)

:  71 (QS system process)

:  90 (QS system process)

 Pulse:  91 (QS system process)

 LaborFlag:  Labor (QS system process)

   

=================================================================

Datetime: 03/30/2017 16:30

=================================================================

   

 Monitor Mode:  External (Shelley Galo, RN)

 Frequency (min):  1-4 (Shelley Rosenthal, RN)

 Quality:  Mild (Shelley Rosenthal, RN)

 Duration (sec):  50-80 (Shelley Galo, RN)

 Resting Tone (Palpate):  Relaxed (Shelleysawyer Rosenthal, RN)

 Monitor Mode:  External US (Shelleysawyer Rosenthal, RN)

 FHR Baseline Rate :  125 (Shelley Galo, RN)

 Variability:  Moderate 6-25 bpm (Shelley Galo, RN)

 Accelerations:  15X15 (Shelley Galo, RN)

 Decelerations:  None (Shelleysawyer Rosenthal, RN)

 Pitocin (milliunit):  Pitocin Remains (milliunits) @ 20 (Shelley Galo,

   RN)

   

=================================================================

Datetime: 03/30/2017 16:19

=================================================================

   

 NBP Sys/Rebecca/Mean (mmHg):  128 (QS system process)

:  78 (QS system process)

:  97 (QS system process)

 Pulse:  81 (QS system process)

 LaborFlag:  Labor (QS system process)

   

=================================================================

Datetime: 03/30/2017 16:15

=================================================================

   

 Monitor Mode:  External (Shelley Rosenthal RN)

 Frequency (min):  1-3 (Shelley Rosenthal RN)

 Quality:  Mild (Shelley Rosenthal RN)

 Duration (sec):  50-80 (Shelley Rosenthal RN)

 Resting Tone (Palpate):  Relaxed (Shelley Rosenthal RN)

 Monitor Mode:  External US (Shelley Rosenthal RN)

 FHR Baseline Rate :  135 (Shelley Rosenthal RN)

 Variability:  Moderate 6-25 bpm (Shelley Rosenthal RN)

 Accelerations:  15X15 (Shelley Rosenthal RN)

 Decelerations:  None (Shelley Rosenthal RN)

 Pitocin (milliunit):  Pitocin Remains (milliunits) @ 20 (Shelley Rosenthal RN)

   

=================================================================

Datetime: 03/30/2017 16:07

=================================================================

   

 Dilatation (cm):  2.0 (Shelley Rosenthal RN)

 Effacement (%):  60 (Shelley Rosenthal RN)

 Station:  -2 (Shelley Rosenthal RN)

 Exam by:  REX alex CNM (Shelley Rosenthal RN)

 Membrane Status:  Ruptured (Shelley Rosenthal RN)

 Membranes Rupture Method:  Artificial (Shelley Rosenthal RN)

 Amniotic Fluid Color:  Clear (Shelley Rosenthal RN)

 Amniotic Fluid Amount:  Large (Shelley Rosenthal RN)

 Amniotic Fluid Odor:  Normal (Shelley Rosenthal RN)

   

=================================================================

Datetime: 03/30/2017 16:05

=================================================================

   

 Communication Comments:  REX Alex CNM at bedside (Shelley Rosenthal RN)

   

=================================================================

Datetime: 03/30/2017 16:00

=================================================================

   

 Monitor Mode:  External (Shelley Rosenthal RN)

 Frequency (min):  1-3 (Shelley Rosenthal RN)

 Quality:  Mild (Shelley Rosenthal RN)

 Duration (sec):  50-80 (Shelley Rosenthal RN)

 Resting Tone (Palpate):  Relaxed (Shelley Rosenthal RN)

 Monitor Mode:  External US (Shelley Rosenthal RN)

 FHR Baseline Rate :  130 (Shelley Rosenthal RN)

 Variability:  Moderate 6-25 bpm (Shelley Rosenthal RN)

 Accelerations:  15X15 (Shelley Rosenthal RN)

 Decelerations:  None (Shelley Rosenthal RN)

 Pitocin (milliunit):  Pitocin Remains (milliunits) @ 20 (Shelley Rosenthal RN)

 I/O Interventions:  Up to BR (Shelley Rosenthal RN)

   

=================================================================

Datetime: 03/30/2017 15:45

=================================================================

   

 Monitor Mode:  External (Shelley Rosenthal, RN)

 Frequency (min):  1-3 (Shelleysawyer Rosenthal, RN)

 Quality:  Mild (Shelley Galo, RN)

 Duration (sec):  50-80 (Shelleysawyer Rosenthal, RN)

 Duration Criteria:  Less than Two 120 Second Contractions (Shelleysawyer Rosenthal, RN)

 Pattern:  Normal: <= 5 Contractions in 10 Minutes (Shelley Galo, RN)

 Resting Tone (Palpate):  Relaxed (Shelley Rosenthal, RN)

 Monitor Mode:  External US (Shelley Rosenthal, RN)

 FHR Baseline Rate :  130 (Shelleysawyer Rosenthal, RN)

 Variability:  Moderate 6-25 bpm (Shelley Rosenthal, RN)

 Accelerations:  15X15 (Shelley Rosenthal, RN)

 Decelerations:  None (Shelleysawyer Rosenthal, RN)

 Pitocin (milliunit):  Pitocin Remains (milliunits) @ 20 (Shelley Galo,

   RN)

   

=================================================================

Datetime: 03/30/2017 15:37

=================================================================

   

 I/O Interventions:  Up to BR (Shelley Rosenthal, RN)

   

=================================================================

Datetime: 03/30/2017 15:30

=================================================================

   

 Monitor Mode:  External (Shleleysawyer Rosenthal, RN)

 Frequency (min):  1-3 (Shelley Rosenthal, RN)

 Quality:  Mild (Shelley Galo, RN)

 Duration (sec):  50-80 (Shelleysawyer Rosenthal, RN)

 Resting Tone (Palpate):  Relaxed (Shelley Rosenthal, RN)

 Monitor Mode:  External US (Shelley Rosenthal, RN)

 FHR Baseline Rate :  130 (Shelleysaywer Rosenthal, RN)

 Variability:  Moderate 6-25 bpm (Shelleysawyer Rosenthal, RN)

 Accelerations:  15X15 (Shelleysawyer Rosenthal, RN)

 Decelerations:  None (Shelley Rosenthal, RN)

 Pitocin (milliunit):  Pitocin Remains (milliunits) @ 20 (Shelley Galo,

   RN)

   

=================================================================

Datetime: 03/30/2017 15:23

=================================================================

   

 IV/Blood Work:  New IV Bag Hung (Shelley Galo, RN)

   

=================================================================

Datetime: 03/30/2017 15:15

=================================================================

   

 Monitor Mode:  External (Shelley Rosenthal, RN)

 Frequency (min):  1-3 (Shelley Rosenthal, RN)

 Quality:  Mild (Shelley Rosenthal, RN)

 Duration (sec):  50-80 (Shelley Rosenthal, RN)

 Resting Tone (Palpate):  Relaxed (Shelley Rosenthal, RN)

 Monitor Mode:  External US (Shelley Rosenthal, RN)

 FHR Baseline Rate :  135 (Shelley Rosenthal, RN)

 Variability:  Moderate 6-25 bpm (Shelley Rosenthal, RN)

 Accelerations:  15X15 (Shelley Rosenthal, RN)

 Decelerations:  None (Shelley Rosenthal, RN)

 Pitocin (milliunit):  Pitocin Remains (milliunits) @ 20 (Shelley Rosenthal,

   RN)

   

=================================================================

Datetime: 03/30/2017 15:00

=================================================================

   

 Monitor Mode:  External (Shelley Rosenhtal, RN)

 Frequency (min):  1-3 (Shelley Rosenthal, RN)

 Quality:  Mild (Shelley Rosenthal, RN)

 Duration (sec):  50-80 (Shelley Rosenthal, RN)

 Resting Tone (Palpate):  Relaxed (Shelley Rosenthal, RN)

 Monitor Mode:  External US (Shelley Rosenthal, RN)

 FHR Baseline Rate :  135 (Shelley Rosenthal, RN)

 Variability:  Moderate 6-25 bpm (Shelley Rosenthal, RN)

 Accelerations:  15X15 (Shelley Rosenthal, RN)

 Decelerations:  None (Shelley Rosenthal, RN)

 Pitocin (milliunit):  Pitocin Remains (milliunits) @ 20 (Shelley Rosenthal,

   RN)

   

=================================================================

Datetime: 03/30/2017 14:45

=================================================================

   

 Monitor Mode:  External (Shelley Rosenthal RN)

 Frequency (min):  1-2 (Shelley Rosenthal RN)

 Quality:  Mild (Shelley Rosenthal, RN)

 Duration (sec):  50-80 (Shelley Rosenthal RN)

 Resting Tone (Palpate):  Relaxed (Shelley Rosenthal RN)

 Monitor Mode:  External US (Shelley Rosenthal RN)

 FHR Baseline Rate :  125 (Shelley Rosenthal RN)

 Variability:  Moderate 6-25 bpm (Shelley Rosenthal, RN)

 Accelerations:  15X15 (Shelley Rosenthal, RN)

 Decelerations:  None (Shelley Rosenthal, RN)

 Pitocin (milliunit):  Pitocin Remains (milliunits) @ 20 (Shelley Rosenthal,

   RN)

   

=================================================================

Datetime: 03/30/2017 14:33

=================================================================

   

 Monitor Interventions for FHR:  Ultrasound Adjusted (Shelley Rosenthal RN)

 Communication:  RN at Bedside (Shelley Rosenthal RN)

   

=================================================================

Datetime: 03/30/2017 14:31

=================================================================

   

 Patient Care Comments:  Pt sitting on edge of bed (Shelley Rosenthal, RN)

   

=================================================================

Datetime: 03/30/2017 14:30

=================================================================

   

 Monitor Mode:  External (Shelley Rosenthal RN)

 Frequency (min):  1-3 (Shelley Rosenthal RN)

 Quality:  Mild (Shelley Rosenthal, RN)

 Duration (sec):  50-80 (Shelley Rosenthal, RN)

 Resting Tone (Palpate):  Relaxed (Shelley Rosenthal RN)

 Monitor Mode:  External US (Shelley Rosenthal RN)

 FHR Baseline Rate :  130 (Shelley Rosenthal RN)

 Variability:  Moderate 6-25 bpm (Shelley Rosenthal, RN)

 Accelerations:  15X15 (Shelley Rosenthal, RN)

 Decelerations:  None (Shelley Rosenthal, RN)

 Pitocin (milliunit):  Pitocin Remains (milliunits) @ 20 (Shelley Rosenthal

   RN)

   

=================================================================

Datetime: 03/30/2017 14:19

=================================================================

   

 NBP Sys/Rebecca/Mean (mmHg):  113 (QS system process)

:  71 (QS system process)

:  87 (QS system process)

 Pulse:  96 (QS system process)

 LaborFlag:  Labor (QS system process)

   

=================================================================

Datetime: 03/30/2017 14:15

=================================================================

   

 Monitor Mode:  External (Shelley Rosenthal RN)

 Frequency (min):  1-3 (Shelley Rosenthal RN)

 Quality:  Mild (Shelley Rosenthal RN)

 Duration (sec):  50-80 (Shelley Rosenthal RN)

 Resting Tone (Palpate):  Relaxed (Shelley Rosenthal RN)

 Monitor Mode:  External US (Shelley Rosenthal RN)

 FHR Baseline Rate :  135 (Shelley Rosenthal RN)

 Variability:  Moderate 6-25 bpm (Shelley Rosenthal RN)

 Accelerations:  15X15 (Shelley Rosenthal, RN)

 Decelerations:  None (Shelley Rosenthal, RN)

 Pitocin (milliunit):  Pitocin Remains (milliunits) @ 20 (Shelley Rosenthal RN)

   

=================================================================

Datetime: 03/30/2017 14:05

=================================================================

   

 NBP Sys/Rebecca/Mean (mmHg):  126 (QS system process)

:  76 (QS system process)

:  94 (QS system process)

 Pulse:  96 (QS system process)

 LaborFlag:  Labor (QS system process)

   

=================================================================

Datetime: 03/30/2017 14:01

=================================================================

   

 Patient Position/Activity:  Right Lateral (Shelley Rosenthal RN)

   

=================================================================

Datetime: 03/30/2017 14:00

=================================================================

   

 Monitor Mode:  External (Shelley Rosenthal RN)

 Frequency (min):  1-2 (Shelley Rosenthal RN)

 Quality:  Mild (Shelley Rosenthal RN)

 Duration (sec):  50-80 (Shelley Rosenthal RN)

 Resting Tone (Palpate):  Relaxed (Shelley Rosenthal RN)

 Monitor Mode:  External US (Shelley Rosenthal RN)

 FHR Baseline Rate :  135 (Shelley Rosenthal RN)

 Variability:  Moderate 6-25 bpm (Shelley Rosenthal RN)

 Accelerations:  15X15 (Shelley Rosenthal RN)

 Decelerations:  None (Shelley Rosenthal RN)

 Pitocin (milliunit):  Pitocin Remains (milliunits) @ 20 (Shelley Rosenthal RN)

   

=================================================================

Datetime: 03/30/2017 13:49

=================================================================

   

 NBP Sys/Rebecca/Mean (mmHg):  134 (QS system process)

:  67 (QS system process)

:  95 (QS system process)

 Pulse:  98 (QS system process)

 LaborFlag:  Labor (QS system process)

   

=================================================================

Datetime: 03/30/2017 13:45

=================================================================

   

 Monitor Mode:  External (Shelley Rosenthal RN)

 Frequency (min):  1-3 (Shelley Rosenthal RN)

 Quality:  Mild (Shelley Rosenthal RN)

 Duration (sec):  50-80 (Shelley Rosenthal RN)

 Resting Tone (Palpate):  Relaxed (Shelley Rosenthal RN)

 Monitor Mode:  External US (Shelley Rosenthal RN)

 FHR Baseline Rate :  135 (Shelley Rosenthal RN)

 Variability:  Moderate 6-25 bpm (Shelley Rosenthal RN)

 Accelerations:  15X15 (Shelley Rosenthal RN)

 Decelerations:  None (Shelley Rosenthal RN)

 Pitocin (milliunit):  Pitocin Remains (milliunits) @ 20 (Shelley Rosenthal RN)

   

=================================================================

Datetime: 03/30/2017 13:34

=================================================================

   

 NBP Sys/Rebecca/Mean (mmHg):  126 (QS system process)

:  66 (QS system process)

:  85 (QS system process)

 Pulse:  96 (QS system process)

 LaborFlag:  Labor (QS system process)

   

=================================================================

Datetime: 03/30/2017 13:30

=================================================================

   

 Monitor Mode:  External (Shelley Rosenthal RN)

 Frequency (min):  1-3 (Shelley Rosenthal RN)

 Quality:  Mild (Shelley Rosenthal RN)

 Duration (sec):  50-80 (Shelley Rosenthal RN)

 Resting Tone (Palpate):  Relaxed (Shelley Rosenthal RN)

 Monitor Mode:  External US (Shelley Rosenthal RN)

 FHR Baseline Rate :  130 (Shelley Rosenthal RN)

 Variability:  Moderate 6-25 bpm (Shelley Rosenthal RN)

 Accelerations:  15X15 (Shelley Rosenthal RN)

 Decelerations:  None (Shelley Rosenthal RN)

 Pitocin (milliunit):  Pitocin Increased to (milliunits) @ 20 (Shelley Rosenthal RN)

   

=================================================================

Datetime: 03/30/2017 13:27

=================================================================

   

 Patient Position/Activity:  Left Lateral (Shelley Rosenthal, RN)

   

=================================================================

Datetime: 03/30/2017 13:26

=================================================================

   

 Comments:  tracing maternal HR, adjusted ultrasound (Shelley Rosenthal, RN)

   

=================================================================

Datetime: 03/30/2017 13:20

=================================================================

   

 I/O Interventions:  Up to BR (Shelley Rosenthal, RN)

   

=================================================================

Datetime: 03/30/2017 13:15

=================================================================

   

 Monitor Mode:  External (Shelley Rosenthal RN)

 Frequency (min):  1-2 (Shelley Rosenthal, RN)

 Quality:  Mild (Shelley Rosenthal, RN)

 Duration (sec):  50-80 (Shelley Rosenthal, RN)

 Resting Tone (Palpate):  Relaxed (Shelley Rosenthal, RN)

 Monitor Mode:  External US (Shelley Rosenthal RN)

 FHR Baseline Rate :  135 (Shelley Rosenthal, RN)

 Variability:  Moderate 6-25 bpm (Shelley Rosenthal, RN)

 Accelerations:  15X15 (Shelley Rosenthal, RN)

 Decelerations:  None (Shelley Rosenthal, RN)

 Pitocin (milliunit):  Pitocin Increased to (milliunits) @ 18 (Shelley Rosenthal, RN)

   

=================================================================

Datetime: 03/30/2017 13:05

=================================================================

   

 NBP Sys/Rebecca/Mean (mmHg):  121 (QS system process)

:  82 (QS system process)

:  95 (QS system process)

 Pulse:  88 (QS system process)

 LaborFlag:  Labor (QS system process)

   

=================================================================

Datetime: 03/30/2017 13:00

=================================================================

   

 Monitor Mode:  External (Shelley Rosenthal, RN)

 Frequency (min):  1-3 (Shelley Rosenthal RN)

 Quality:  Mild (Shelley Rosenthal, RN)

 Duration (sec):  50-80 (Shelley Rosenthal, RN)

 Resting Tone (Palpate):  Relaxed (Shelley Rosenthal, RN)

 Monitor Mode:  External US (Shelley Rosenthal, RN)

 FHR Baseline Rate :  135 (Shelley Rosenthal RN)

 Variability:  Moderate 6-25 bpm (Shelley Rosenthal, RN)

 Accelerations:  15X15 (Shelley Rosenthal, RN)

 Decelerations:  None (Shelley Rosenthal, RN)

 Pitocin (milliunit):  Pitocin Increased to (milliunits) @ 16 (Shelley Rosenthal, RN)

   

=================================================================

Datetime: 03/30/2017 12:49

=================================================================

   

 NBP Sys/Rebecca/Mean (mmHg):  127 (QS system process)

:  57 (QS system process)

:  82 (QS system process)

 Pulse:  87 (QS system process)

 LaborFlag:  Labor (QS system process)

   

=================================================================

Datetime: 03/30/2017 12:45

=================================================================

   

 Monitor Mode:  External (Shelley Rosenthal, RN)

 Frequency (min):  1-4 (Shelley Rosenthal, RN)

 Quality:  Mild (Shelley Rosenthal, RN)

 Duration (sec):  50-80 (Shelley Rosenthal, RN)

 Resting Tone (Palpate):  Relaxed (Shelley Rosenthal, RN)

 Monitor Mode:  External US (Shelley Rosenthal, RN)

 FHR Baseline Rate :  125 (Shelley Rosenthal, RN)

 Variability:  Moderate 6-25 bpm (Shelley Rosenthal, RN)

 Accelerations:  None (Shelley Rosenthla, RN)

 Decelerations:  None (Shelley Rosenthal, RN)

 Pitocin (milliunit):  Pitocin Remains (milliunits) @ 14 (Shelley Rosenthal,

   RN)

   

=================================================================

Datetime: 03/30/2017 12:33

=================================================================

   

 Monitor Interventions for FHR:  Ultrasound Adjusted (Shelley Rosenthal, RN)

   

=================================================================

Datetime: 03/30/2017 12:30

=================================================================

   

 Respirations:  16 (Shelley Rosenthal RN)

 Temperature (F):  98.0 (Shelley Rosenthal RN)

 Temperature (C):  36.7 (QS system process)

 Monitor Mode:  External (Shelley Rosenthal RN)

 Frequency (min):  1-3 (Shelley Rosenthal RN)

 Quality:  Mild (Shelley Rosenthal RN)

 Duration (sec):  50-80 (Shelley Rosenthal RN)

 Resting Tone (Palpate):  Relaxed (Shelley Rosenthal RN)

 Monitor Mode:  External US (Shelley Rosenthal RN)

 FHR Baseline Rate :  125 (Shelley Rosenthal RN)

 Variability:  Moderate 6-25 bpm (Shelley Rosenthal RN)

 Decelerations:  None (Shelley Rosenthal RN)

 Pain Scale:  1 (Shelley Rosenthal RN)

 Pain Presence:  Intermittent (Shelley Rosenthal RN)

 Pain Type:  Contraction (Shelley Rosenthal RN)

 Pain Location:  Abdomen (Shelley Rosenthal RN)

 Pain Relief Measures:  Comfort Measures (Shelley Rosenthal RN)

 Pain Coping:  Talking Through Contractions (Shelley Rosenthal RN)

 Pitocin (milliunit):  Pitocin Remains (milliunits) @ 14 (Shelley Rosenthal RN)

 LaborFlag:  Labor (QS system process)

   

=================================================================

Datetime: 03/30/2017 12:29

=================================================================

   

 Patient Position/Activity:  Right Lateral (Shelley Rosenthal RN)

   

=================================================================

Datetime: 03/30/2017 12:19

=================================================================

   

 NBP Sys/Rebecca/Mean (mmHg):  111 (QS system process)

:  69 (QS system process)

:  84 (QS system process)

 Pulse:  94 (QS system process)

 LaborFlag:  Labor (QS system process)

   

=================================================================

Datetime: 03/30/2017 12:15

=================================================================

   

 Monitor Mode:  External (Shelley Rosenthal RN)

 Frequency (min):  1-2 (Shelley Rosenhtal RN)

 Quality:  Mild (Shelley Rosenthal RN)

 Duration (sec):  50-80 (Shelley Rosenthal RN)

 Resting Tone (Palpate):  Relaxed (Shelley Rosenthal RN)

 Monitor Mode:  External US (Shelley Rosenthal RN)

 FHR Baseline Rate :  135 (Shelley Rosenthal RN)

 Variability:  Moderate 6-25 bpm (Shelley Rosenthal RN)

 Decelerations:  None (Shelley Rosenthal RN)

 Pitocin (milliunit):  Pitocin Increased to (milliunits) @ 14 (Shelley Rosenthal RN)

   

=================================================================

Datetime: 03/30/2017 12:01

=================================================================

   

 Monitor Interventions for UA:  [place] Adjusted (Shelley Rosenthal RN)

 Comfort Measures:  Rocking Chair (Shelley Rosenthal RN)

   

=================================================================

Datetime: 03/30/2017 12:00

=================================================================

   

 Monitor Mode:  External (Shelley Rosenthal RN)

 Frequency (min):  1-3 (Shelley Rosenthal RN)

 Quality:  Mild (Shelley Rosenthal RN)

 Duration (sec):  50-80 (Shelley Rosenthal RN)

 Resting Tone (Palpate):  Relaxed (Shelley Rosenthal RN)

 Comments:  unable to determine due to pt position on birthing ball. RN

   adjusting monitors (Shelley Rosenthal RN)

 Pitocin (milliunit):  Pitocin Remains (milliunits) @ 12 (Shelley Rosenthal RN)

   

=================================================================

Datetime: 03/30/2017 11:45

=================================================================

   

 Comments:  unable to determine due to pt being up in BR and sitting on

   birthing ball. RN adjusting monitors (Shelley Rosenthal, RN)

 Pitocin (milliunit):  Pitocin Remains (milliunits) @ 12 (Shelley Rosenthal,

   RN)

   

=================================================================

Datetime: 03/30/2017 11:37

=================================================================

   

 Monitor Interventions for FHR:  Ultrasound Adjusted (Shelley Rosenthal, RN)

 Patient Position/Activity:  Birthing Ball (Shelley Rosenthal, RN)

 Communication:  RN at Bedside (Shelley Rosenthal RN)

   

=================================================================

Datetime: 03/30/2017 11:32

=================================================================

   

 I/O Interventions:  Up to BR (Shelley Rosenthal, RN)

   

=================================================================

Datetime: 03/30/2017 11:30

=================================================================

   

 Monitor Mode:  External (Shelley Rosenthal, RN)

 Frequency (min):  1-3 (Shelley Rosenthal, RN)

 Quality:  Mild (Shelley Rosenthal, RN)

 Duration (sec):  50-80 (Shelley Rosenthal, RN)

 Resting Tone (Palpate):  Relaxed (Shelley Rosenthal, RN)

 Monitor Mode:  External US (Shelley Rosenthal, RN)

 FHR Baseline Rate :  135 (Shelley Rosenthal, RN)

 Variability:  Moderate 6-25 bpm (Shelley Rosenthal, RN)

 Accelerations:  15X15 (Shelley Rosenthal, RN)

 Decelerations:  None (Shelley Rosenthal, RN)

 Pitocin (milliunit):  Pitocin Increased to (milliunits) @ 12 (hSelley Rosenthal, RN)

   

=================================================================

Datetime: 03/30/2017 11:19

=================================================================

   

 NBP Sys/Rebecca/Mean (mmHg):  126 (QS system process)

:  75 (QS system process)

:  94 (QS system process)

 Pulse:  88 (QS system process)

 LaborFlag:  Labor (QS system process)

   

=================================================================

Datetime: 03/30/2017 11:15

=================================================================

   

 Monitor Mode:  External (Shelley Rosenthal, RN)

 Frequency (min):  1-3 (Shelley Rosenthal, RN)

 Quality:  Mild (Shelley Rosenthal, RN)

 Duration (sec):  50-80 (Shelley Rosenthal, RN)

 Resting Tone (Palpate):  Relaxed (Shelley Rosenthal, RN)

 Monitor Mode:  External US (Shelley Rosenthal, RN)

 FHR Baseline Rate :  125 (Shelley Rosenthal, RN)

 Variability:  Moderate 6-25 bpm (Shelley Rosenthal, RN)

 Accelerations:  15X15 (Shelley Rosenthal, RN)

 Decelerations:  None (Shelley Rosenthal, RN)

 Pitocin (milliunit):  Pitocin Remains (milliunits) @ 10 (Shelley Rosenthal,

   RN)

   

=================================================================

Datetime: 03/30/2017 11:04

=================================================================

   

 NBP Sys/Rebecca/Mean (mmHg):  119 (QS system process)

:  76 (QS system process)

:  92 (QS system process)

 Pulse:  97 (QS system process)

 LaborFlag:  Labor (QS system process)

   

=================================================================

Datetime: 03/30/2017 11:00

=================================================================

   

 Monitor Mode:  External (Shelley Rosenthal, RN)

 Frequency (min):  1-3 (Shelley Rosenthal RN)

 Quality:  Mild (Shelley Rosenthal RN)

 Duration (sec):  50-80 (Shelley Rosenthal RN)

 Duration Criteria:  Less than Two 120 Second Contractions (Shelley Rosenthal RN)

 Pattern:  Normal: <= 5 Contractions in 10 Minutes (Shelley Rosenthal RN)

 Resting Tone (Palpate):  Relaxed (Shelley Rosenthal RN)

 Monitor Mode:  External US (Shelley Rosenthal RN)

 FHR Baseline Rate :  130 (Shelley Rosenthal RN)

 Variability:  Moderate 6-25 bpm (Shelley Rosenthal, RN)

 Accelerations:  15X15 (Shelley Rosenthal, RN)

 Decelerations:  None (Shelley Rosenthal RN)

 Pitocin (milliunit):  Pitocin Increased to (milliunits) @ 10 (Shelley Rosenthal RN)

   

=================================================================

Datetime: 03/30/2017 10:56

=================================================================

   

 Bedside Blood Glucose:  95 (QS system process)

 LaborFlag:  Labor (QS system process)

   

=================================================================

Datetime: 03/30/2017 10:49

=================================================================

   

 NBP Sys/Rebecca/Mean (mmHg):  123 (QS system process)

:  79 (QS system process)

:  95 (QS system process)

 Pulse:  94 (QS system process)

 LaborFlag:  Labor (QS system process)

   

=================================================================

Datetime: 03/30/2017 10:45

=================================================================

   

 Monitor Mode:  External (Shelley Rosenthal, RN)

 Frequency (min):  1-3 (Shelley Rosenthal RN)

 Quality:  Mild (Shelley Rosenthal RN)

 Duration (sec):  50-80 (Shelley Rosenthal RN)

 Resting Tone (Palpate):  Relaxed (Shelley Rosenthal RN)

 Monitor Mode:  External US (Shelley Rosenthal RN)

 FHR Baseline Rate :  135 (Shelley Rosenthal RN)

 Variability:  Moderate 6-25 bpm (Shelley Rosenthal RN)

 Accelerations:  15X15 (Shelley Rosenthal RN)

 Decelerations:  None (Shelley Rosenthal RN)

 Pitocin (milliunit):  Pitocin Increased to (milliunits) @ 8 (Shelley Rosenthal RN)

   

=================================================================

Datetime: 03/30/2017 10:30

=================================================================

   

 Monitor Mode:  External (Shelley Rosenthal RN)

 Frequency (min):  1-3 (Shelley Rosenthal RN)

 Quality:  Mild (Shelley Rosenthal RN)

 Duration (sec):  50-80 (Shelley Rosenthal RN)

 Resting Tone (Palpate):  Relaxed (Shelley Rosenthal RN)

 Monitor Mode:  External US (Shelley Rosenthal RN)

 FHR Baseline Rate :  145 (Shelley Rosenthal RN)

 Variability:  Moderate 6-25 bpm (Shelley Rosenthal RN)

 Accelerations:  15X15 (Shelley Rosenthal RN)

 Decelerations:  None (Shelley Rosenthal RN)

 Pitocin (milliunit):  Pitocin Increased to (milliunits) @ 6 (Shelley Rosenthal RN)

   

=================================================================

Datetime: 03/30/2017 10:21

=================================================================

   

 Monitor Interventions for FHR:  Ultrasound Adjusted (Shelley Rosenthal RN)

 Patient Position/Activity:  Left Lateral (Shelley Rosenthal RN)

   

=================================================================

Datetime: 03/30/2017 10:15

=================================================================

   

 Monitor Mode:  External (Shelley Rosenthal RN)

 Frequency (min):  1-3 (Shelley Rosenthal RN)

 Quality:  Mild (Shelley Rosenthal RN)

 Duration (sec):  50-80 (Shelley Rosenthal RN)

 Duration Criteria:  Less than Two 120 Second Contractions (Shelley Rosenthal RN)

 Pattern:  Normal: <= 5 Contractions in 10 Minutes (Shelley Rosenthal RN)

 Resting Tone (Palpate):  Relaxed (Shelley Rosenthal RN)

 Monitor Mode:  External US (Shelley Rosenthal RN)

 FHR Baseline Rate :  135 (Shelley Rosenthal, RN)

 Variability:  Moderate 6-25 bpm (Shelley Rosenthal RN)

 Accelerations:  15X15 (Shelley Rosenthal RN)

 Decelerations:  None (Shelley Rosenthal RN)

 Pitocin (milliunit):  Pitocin Increased to (milliunits) @ 4 (Shelley Rosenthal RN)

   

=================================================================

Datetime: 03/30/2017 10:00

=================================================================

   

 Monitor Mode:  External (Shelley Rosenthal RN)

 Frequency (min):  1.5-3 (Shelley Rosenthal RN)

 Quality:  Mild (Shelley Rosenthal RN)

 Duration (sec):  50-80 (Shelley Rosenthal RN)

 Resting Tone (Palpate):  Relaxed (Shelley Rosenthal RN)

 Monitor Mode:  External US (Shelley Rosenthal RN)

 FHR Baseline Rate :  135 (Shelley Rosenthal RN)

 Variability:  Moderate 6-25 bpm (Shelley Rosenthal, RN)

 Accelerations:  15X15 (Shelley Rosenthal RN)

 Decelerations:  None (Shelley Rosenthal RN)

 Pitocin (milliunit):  Pitocin Started (milliunits) @ 2; Pitocin 20

   Units in 1000ml NS (Shelley Rosenthal RN)

   

=================================================================

Datetime: 03/30/2017 09:51

=================================================================

   

 Communication Comments:  Dr Echeverria on unit. Orders to start Pitocin and

   increase q 15 minutes per protocol (Shelley Rosenthal RN)

   

=================================================================

Datetime: 03/30/2017 09:50

=================================================================

   

 NBP Sys/Rebecca/Mean (mmHg):  117 (QS system process)

:  68 (QS system process)

:  87 (QS system process)

 Pulse:  108 (QS system process)

 Dilatation (cm):  2.5 (Shelley Rosenthal RN)

 Effacement (%):  50 (Shelley Rosenthal RN)

 Station:  -2 (Shelley Rosenthal RN)

 Exam by:  JORGITO Rosenthal RN (Shelley Rosenthal RN)

 LaborFlag:  Labor (QS system process)

   

=================================================================

Datetime: 03/30/2017 09:46

=================================================================

   

 Patient Position/Activity:  Right Lateral (Shelley Rosenthal, RN)

   

=================================================================

Datetime: 03/30/2017 08:54

=================================================================

   

 Communication Comments:  Monitors removed for pt to walk. IV saline

   locked.  (Shelley Rosenthal, RN)

   

=================================================================

Datetime: 03/30/2017 08:30

=================================================================

   

 Monitor Mode:  External (Shelley Rosenthal RN)

 Frequency (min):  1-3 (Shelley Rosenthal RN)

 Quality:  Mild (Shelley Rosenthal RN)

 Duration (sec):  50-80 (Shelley Rosenthal RN)

 Resting Tone (Palpate):  Relaxed (Shelley Rosenthal RN)

 Monitor Mode:  External US (Shelley Rosenthal RN)

 FHR Baseline Rate :  135 (Shelley Rosenthal RN)

 Variability:  Moderate 6-25 bpm (Shelley Rosenthal RN)

 Accelerations:  15X15 (Shelley Rosenthal RN)

 Decelerations:  None (Shelley Rosenthal, RN)

   

=================================================================

Datetime: 03/30/2017 08:28

=================================================================

   

 Patient Care Comments:  Meal given (Shelley Rosenthal RN)

   

=================================================================

Datetime: 03/30/2017 08:07

=================================================================

   

 I/O Interventions:  Up to BR (Shelley Rosenthal RN)

   

=================================================================

Datetime: 03/30/2017 08:00

=================================================================

   

 Respirations:  14 (Shelley Rosenthal RN)

 Temperature (F):  98.6 (Shelley Rosenthal RN)

 Temperature (C):  37.0 (QS system process)

 Monitor Mode:  External (Shelley Rosenthal RN)

 Frequency (min):  1-3 (Shelley Rosenthal RN)

 Quality:  Mild (Shelley Rosenthal RN)

 Duration (sec):  50-70 (Shelley Rosenthal RN)

 Duration Criteria:  Less than Two 120 Second Contractions (Shelley Rosenthal RN)

 Pattern:  Normal: <= 5 Contractions in 10 Minutes (Shelley Rosenthal RN)

 Resting Tone (Palpate):  Relaxed (Shelley Rosenthal RN)

 Monitor Mode:  External US (Shelley Rosenthal RN)

 FHR Baseline Rate :  125 (Shelley Rosenthal RN)

 Variability:  Moderate 6-25 bpm (Shelley Rosenthal RN)

 Accelerations:  15X15 (Shelley Rosenthal RN)

 Decelerations:  None (Shelley Rosenthal RN)

 Pain Scale:  2 (Shelley Rosenthal RN)

 Pain Presence:  Intermittent (Shelley Rosenthal RN)

 Pain Type:  Contraction (Shelley Rosenthal RN)

 Pain Location:  Abdomen (Shelley Rosenthal RN)

 Pain Relief Measures:  Comfort Measures (Shelley Rosenthal RN)

 Pain Coping:  Talking Through Contractions (Shelley Rosenthal RN)

 Level of Consciousness:  Fully Conscious (Shelley Rosenthal RN)

 DTR's/Clonus:  DTRs 2+; No Clonus (Shelley Rosenthal RN)

 Headache:  Denies (Shelley Rosenthal RN)

 Breath Sounds, Left:  Clear and Equal (Shelley Rosenthal RN)

 Breath Sounds, Right:  Clear and Equal (Shelley Rosenthal RN)

 Nausea/Vomiting:  Denies (Shelley Rosenthal RN)

 RUQ Epigastric Pain:  Denies (Shelley Rosenthal RN)

 LaborFlag:  Labor (QS system process)

   

=================================================================

Datetime: 03/30/2017 07:49

=================================================================

   

 LaborFlag:  Labor (QS system process)

   

=================================================================

Datetime: 03/30/2017 07:30

=================================================================

   

 Monitor Mode:  External (Shelley Rosenthal RN)

 Frequency (min):  1-2 (Shelley Rosenthal RN)

 Quality:  Mild (Shelley Rosenthal RN)

 Duration (sec):  50-70 (Shelley Rosenthal RN)

 Resting Tone (Palpate):  Relaxed (Shelley Rosenthal RN)

 Monitor Mode:  External US (Shelley Rosenthal RN)

 FHR Baseline Rate :  135 (Shelley Rosenthal RN)

 Variability:  Moderate 6-25 bpm (Shelley Rosenthal RN)

 Accelerations:  15X15 (Shelley Rosenthal RN)

 Decelerations:  None (Shelley Rosenthal RN)

## 2017-03-30 NOTE — L&D PROGRESS NOTES
=================================================================

PROGRESS NOTES

=================================================================

Datetime Report Generated by CPN: 03/30/2017 16:17

   

   

=================================================================

PROGRESS NOTE

=================================================================

   

Impression:  Reassuring Fetal Heart Rate

Procedures:  Artificial ROM; Sterile Vag Exam

Plan:  Continue Present Management

Vital Signs :  Reviewed; Within Normal Limits

Comment:  SVE w AROM with copious amount of clear fluid noted.  Fluid

   slowly leaked out-vertex firmly against cx at finish.

   

=================================================================

VAGINAL EXAM

=================================================================

   

Dilatation:  2

Dilatation:  2

Effacement:  60

Effacement:  50

Station:  -2

Station:  -3

   

=================================================================

MEMBRANES

=================================================================

   

Pooling:  Negative

Membranes:  Ruptured

Membranes:  Intact

Amniotic Fluid Color:  Clear

   

=================================================================

FETUS A

=================================================================

   

Monitoring:  External US

FHR Category:  Category I

:  40.2

Estimated Fetal Weight (gm):  4000

Fetal Presentation:  Vertex

   

=================================================================

SIGNATURE

=================================================================

   

SIGNATURE:  [id number]

Assignment:  Grupo Echeverria DO

Signature:  Electronically signed by Jess Morejon CNM on 3/30/2017 at

   16:16  with User ID: [email or web address]

:  Electronically signed by Jess Morejon CNM on 3/30/2017 at 16:16 

   with User ID: [email or web address]

:  I personally evaluated and examined the patient in conjunction with

   the MLP and agree with the assessment, treatment plan and

   disposition.

## 2017-03-31 NOTE — DELIVERY SUMMARY
=================================================================

Del Sum A-C

=================================================================

Datetime Report Generated by CPN: 2017 02:05

   

   

=================================================================

ADMISSION DATA

=================================================================

   

Chief Complaint:  Scheduled Induction of Labor

Indication for Induction:  Not Applicable; Maternal Diabetes

Admission Impression:  Term, Intrauterine Pregnancy

   

=================================================================

DELIVERY PERSONNEL

=================================================================

   

Nurse Midwife Certified::  Jess Morejon CNJAIRO

Labor and Delivery Nurse::  Dacia Hernandez RN

Labor and Delivery Nurse::  Josefa Grossman RN

Additional Personnel: :  Blaze Kike, CNA

   

=================================================================

MATERNAL INFORMATION

=================================================================

   

Delivery Anesthesia:  Epidural

Estimated  Blood Loss (ml):  250

Maternal Complications:  Other

Other Maternal Complications:  GDM, poly

Provider Comments:   of viable male in vertex OA to CARINA under

   epidural anesthesia at 0101.  Nuchal cord x1 reduced prior to

   delivery of shoulders.  Bulb suctioned mouth and nose. Spontaneous

   respirations and cry.  Apgars 8-9.  3 vessel cord.  Cord clamped x2,

   after 3 min delay, then cut by FOB.  Placenta, membranes, and cord

   expelled at 0105, Ku presentation.  Small tear in right

   periurethra stitched a noted above.  Patient tolerated the procedure

   well.

   

=================================================================

LABOR SUMMARY

=================================================================

   

EDC:  2017 00:00

No. Babies in Womb:  1

Labor Anesthesia:  Epidural

   

=================================================================

LABOR INFORMATION

=================================================================

   

Reason for Induction:  Maternal Diabetes

Onset of Labor:  2017 18:39

Complete Dilatation:  2017 00:50

Cervical Ripening Agents:  Cytotec @

Other Ripening Agents:  Cytotec

Oxytocin:  Induction

Group B Beta Strep:  negative

Steroids Given:  None

Reason Steroids Not Administered:  Not Applicable

   

=================================================================

MEMBRANES

=================================================================

   

Membranes Rupture Method:  Artificial

Rupture of Membranes:  2017 16:07

Length of Rupture (hr):  8.90

Amniotic Fluid Color:  Clear

Amniotic Fluid Amount:  Large

Amniotic Fluid Odor:  Normal

   

=================================================================

STAGES OF LABOR

=================================================================

   

Stage 1 hr:  6

Stage 1 min:  11

Stage 2 hr:  0

Stage 2 min:  11

Stage 3 hr:  0

Stage 3 min:  6

Total Time in Labor hr:  6

Total Time in Labor min:  28

   

=================================================================

VAGINAL DELIVERY

=================================================================

   

Episiotomy:  None

Laceration Extension:  N/A

Laceration Type:  Periurethral

Laceration Repair:  Yes

Laceration Repair Note:  2 interrupted stitches in left periurethral

   area using 3-0 Chromic

Sponge Count Correct:  N/A

Sharps Count Correct:  Yes

   

=================================================================

CSECTION DELIVERY

=================================================================

   

CSection Incision:  N/A

   

=================================================================

BABY A INFORMATION

=================================================================

   

Infant Delivery Date/Time:  2017 01:01

Method of Delivery:  Vaginal

Born in Route :  No

:  N/A

   

=================================================================

PRESENTATION/POSITION BABY A

=================================================================

   

Presentation:  Cephalic

Cephalic Presentation:  Vertex

Vertex Position:  Left Occipital Anterior

Breech Presentation:  N/A

   

=================================================================

PLACENTA INFORMATION BABY A

=================================================================

   

Placenta Delivery Time :  2017 01:07

Placenta Method of Delivery:  Spontaneous

Placenta Status:  Delivered

   

=================================================================

APGAR SCORES BABY A

=================================================================

   

Heart Rate 1 min:  >100 bpm

Resp Effort 1 min:  Slow, Irregular

Reflex Irritability 1 min:  Cough or Sneeze or Pulls Away

Muscle Tone 1 min:  Active Motion

Color 1 min:  Body Pink, Extremities Blue

APGAR SCORE 1 MIN:  8

Heart Rate 5 min:  >100 bpm

Resp Effort 5 min:  Good Cry

Reflex Irritability 5 min:  Cough or Sneeze or Pulls Away

Muscle Tone 5 min:  Active Motion

Color 5 min:  Body Pink, Extremities Blue

APGAR SCORE 5 MIN:  9

   

=================================================================

INFANT INFORMATION BABY A

=================================================================

   

Gestational Age at Delivery:  40.4

Gestational Status:  Full Term- 39- 40.6 Weeks

Infant Outcome :  Liveborn

Infant Condition :  Stable

Infant Sex:  Male

   

=================================================================

IDENTIFICATION BABY A

=================================================================

   

Infant Verification Date/Time:  2017 01:12

ID Band Number:  [id number]

Mother's Name Verified:  Yes

Infant Medical Record Number:  [id number]

RN Verifying Infant:  R Chauncey, RNC

Additional Verifying Personnel:  J Field, RN

   

=================================================================

WEIGHT/LENGTH BABY A

=================================================================

   

Infant Birthweight (gm):  3485

Infant Weight (lb):  7

Infant Weight (oz):  11

Infant Length (in):  20.00

Infant Length (cm):  50.80

   

=================================================================

CORD INFORMATION BABY A

=================================================================

   

No. Cord Vessels:  3

Nuchal Cord :  Around Neck x1, Loose

Cord Blood Taken:  Yes-For Storage (Mom's Blood type +)

   

=================================================================

ASSESSMENT BABY A

=================================================================

   

Infant Complications:  None

Physical Findings at Delivery:  Within Normal Limits

Infant Respirations:  Appears Normal

Skin to Skin:  Yes

Skin to Skin Time (min):  15

Infant Care By:  TIMMY Hernandez RN

Transferred To:  Remains with Mother

   

=================================================================

SIGNATURES

=================================================================

   

Assignment:  Grupo Echeverria DO

Signature:  Electronically signed by Jess Morejon CNM on 3/31/2017 at

   01:32  with User ID: [email or web address]

:  Electronically signed by Jess Morejon CNM on 3/31/2017 at 01:32 

   with User ID: [email or web address]

:  I personally evaluated and examined the patient in conjunction with

   the MLP and agree with the assessment, treatment plan and

   disposition.

## 2017-03-31 NOTE — ADMISSION PHYSICAL
=================================================================



=================================================================

Datetime Report Generated by CPN: 2017 03:12

   

   

=================================================================

CURRENT ADMISSION

=================================================================

   

Chief Complaint:  Scheduled Induction of Labor

Indication for Induction:  Not Applicable; Maternal Diabetes

Admit Plan:  Admit to Unit; Initiate Labor Induction Protocol

   

=================================================================

ALLERGIES

=================================================================

   

Medication Allergies:  Yes

Medication Allergies:  penicillin G (2017)

Medication Allergies:  Penicillin

Medication Allergies:  Penicillin

Latex:  No Latex Allergies

   

=================================================================

OBSTETRICAL HISTORY

=================================================================

   

EDC:  2017 00:00

:  2

Para:  1

Term:  1

:  0

SAB:  0

IAB:  0

Livin

Gestational Diabetes:  Yes

Rh Sensitization:  No

Incompetent Cervix:  No

TOSHA:  No

Infertility:  No

ART Treatment:  No

Uterine Anomaly:  No

IUGR:  No

Hx Previous C/S:  No

Macrosomia:  No

Hx Loss/Stillborn:  No

PIH:  No

Hx  Death:  No

Placenta Previa/Abruption:  No

Depression/PP Depression:  No

PTL/PROM:  No

Post Partum Hemorrhage:  No

Current Pregnancy Procedures:  Ultrasound

Obstetrical History Comments:    42 weeks baby girl

    current pregnancy

   

=================================================================

***SEE PRENATAL RECORDS***

=================================================================

   

Alcohol:  No

Marijuana :  No

Cocaine:  No

Other Illicit Drugs:  No

Cigarettes:  Never Smoker. [id number]

   

=================================================================

MEDICAL HISTORY

=================================================================

   

Diabetes:  No

Diabetes Type:  Gestational Diabetes

Blood Transfusion:  No

Pulmonary Disease (Asthma, TB):  No

Breast Disease:  No

Hypertension:  No

Gyn Surgery:  No

Heart Disease:  No

Hosp/Surgery:  No

Autoimmune Disorder:  No

Anesthetic Complications:  No

Kidney Disease:  No

Abnormal Pap Smear:  No

Neuro/Epilepsy:  No

Psychiatric Disorders:  No

Other Medical Diseases:  No

Hepatitis/Liver Disease:  No

Significant Family History:  No

Varicosities/Phlebitis:  No

Trauma/Violence :  No

Thyroid Dysfunction:  No

   

=================================================================

INFECTIOUS HISTORY

=================================================================

   

Gonorrhea:  No

Genital Herpes:  No

Chlamydia:  No

Tuberculosis:  No

Syphilis:  No

Hepatitis:  No

HIV/AIDS Exposure:  No

Rash or Viral Illness:  No

HPV:  No

   

=================================================================

PHYSICAL EXAM

=================================================================

   

General:  Normal

HEENT:  Normal

Neurologic:  Normal

Thyroid:  Normal

Heart:  Normal

Lungs:  Normal

Breast:  Normal

Back:  Normal

Abdomen:  Normal

Genitourinary Exam:  Normal

Extremities:  Normal

DTRs:  Normal

Pelvic Type:  Adequate

Vital Signs:  Reviewed

   

=================================================================

VAGINAL EXAM

=================================================================

   

Dilatation:  2

Dilatation:  2

Effacement:  60

Effacement:  50

Station:  -2

Station:  -3

   

=================================================================

MEMBRANES

=================================================================

   

Pooling:  Negative

Membranes:  Ruptured

Membranes:  Intact

Amniotic Fluid Color:  Clear

   

=================================================================

FETUS A

=================================================================

   

Monitoring:  External US

FHR- Baseline:  140

Variability:  Moderate 6-25bpm

Accelerations:  15X15

Decelerations:  None

FHR Category:  Category I

Estimated Fetal Weight (gm):  4000

Fetal Presentation:  Vertex

   

=================================================================

PLANS FOR LABOR AND DELIVERY

=================================================================

   

Labor and Delivery:  None

Pain Management:  Epidural

Feeding Preference:  Both

Benefit of Breast Feed Discussed:  Yes

Circumcision:  Yes

   

=================================================================

INFORMED CONSENT

=================================================================

   

Signature:  Electronically signed by Hannah White MD (ANDDO) on

   3/29/2017 at 23:54  with User ID: [email or web address]

:  I personally evaluated and examined the patient in conjunction with

   the MLP and agree with the assessment, treatment plan and

   disposition.

:  I personally evaluated and examined the patient in conjunction with

   the MLP and agree with the assessment, treatment plan and

   disposition.

## 2017-04-01 NOTE — PDOC DISCHARGE SUMMARY
Final Diagnosis


Discharge Date: 17





- Final Diagnosis


(1) Acute blood loss anemia


Is this a current diagnosis for this admission?: Yes





(2) Normal vaginal delivery


Is this a current diagnosis for this admission?: Yes








Discharge Data





- Discharge Medication


Home Medications: 








Pnv No.122/Iron/Folic Acid [Prenatal Multi Tablet] 1 tab PO DAILY 11/20/15 


Docusate Sodium [Colace 100 mg Capsule] 100 mg PO BID #60 capsule 17 


Ferrous Sulfate [Feosol 325 mg Tablet] 325 mg PO BIDACBS #60 tablet 17 


Ibuprofen [Motrin 800 mg Tablet] 800 mg PO Q8 #30 tablet 17 








Gestational Age: 40.4


Reason(s) for Admission: Induction of Labor, Gestional Diabetes


Prenatal Procedures: NST


Intrapartum Procedure(s): Spontaneous Vaginal Delivery





-  Data


  ** Baby 1 Male


Apgar at 1 minute: 8


Apgar at 5 minutes: 9


Weight: 3485 kg


Home with Mother: Yes


Complications: No





- Diagnosis Test


Laboratory: 


 











Temp Pulse Resp BP Pulse Ox


 


 97.9 F   89   17   100/65   100 


 


 17 07:53  17 07:53  17 07:53  17 07:53  17 07:53








 











  17





  23:26 23:42 15:30


 


RBC   3.89  3.53 L


 


Hgb   10.6 L  9.5 L


 


Hct   32.2 L  29.3 L


 


Urine Opiates Screen  NEGATIVE  














- Discharge information/Instructions


Discharge Activity: Activity As Tolerated, Pelvic Rest, No tub bath


Discharge Diet: Regular


Disposition: HOME, SELF-CARE


Follow up with: [facility]


in: 4, Weeks

## 2017-04-01 NOTE — PDOC PROGRESS REPORT
Subjective-OB


Subjective: 


Post Delivery Day: 1








23 year old.  Denies any needs at this time,, states lochia is stable, voiding 

without difficulty, pain well controlled, passing gas. 








Physical Exam (OB)


Vital Signs: 


 











Temp Pulse Resp BP Pulse Ox


 


 98.3 F   90   18   121/65   99 


 


 03/31/17 19:41  03/31/17 19:41  03/31/17 19:41  03/31/17 19:41  03/31/17 19:41








 Intake & Output











 03/31/17 04/01/17 04/02/17





 06:59 06:59 06:59


 


Intake Total  900 


 


Balance  900 














- Lochia


Lochia Amount: Scant < 10 ml


Lochia Color: Rubra/Red, Serosa/Brown





- Abdomen


Description: Tender


Hernia Present: No


Fundal Description: Firm


Fundal Height: u/u - u/2





Objective-Diagnostic


Laboratory: 


 





 03/31/17 15:30 





 











  03/31/17





  15:30


 


WBC  11.3 H


 


RBC  3.53 L


 


Hgb  9.5 L


 


Hct  29.3 L


 


MCV  83


 


MCH  26.9 L


 


MCHC  32.4


 


RDW  16.3 H


 


Plt Count  127 L














Assessment and Plan(PN)





- Assessment and Plan


(1) Acute blood loss anemia


Is this a current diagnosis for this admission?: YesPlan: 


ferrous sulfate 


increase dietary iron








(2) Normal vaginal delivery


Is this a current diagnosis for this admission?: YesPlan: 


routine pp care











- Time Spent with Patient


Time with patient: Less than 15 minutes


Critical Time spent with patient: Less than 15 minutes


Medications reviewed and adjusted accordingly: Yes





- Disposition


Anticipated Discharge: Home


Within: within 24 hours

## 2017-08-08 NOTE — ER DOCUMENT REPORT
ED General





- General


Chief Complaint: Nausea/Vomiting


Stated Complaint: VOMITING


Time Seen by Provider: 08/08/17 06:21


TRAVEL OUTSIDE OF THE [place] IN LAST 30 DAYS: No





- HPI


Patient complains to provider of: Nausea vomiting


Notes: 


Patient coming in for nausea vomiting abdominal cramping.  Patient is currently 

on her menstrual cycle.  Patient states she is 4 months postpartum.  Patient is 

actively vomiting upon my entrance into examination room.   states 

patient had pizza sausage along with light snacks throughout the night.  Denies 

any recent travel denies any recent trauma denies any sick contacts denies 

fevers chills chest pain





- Related Data


Allergies/Adverse Reactions: 


 





penicillin G Allergy (Verified 08/08/17 06:23)


 











Past Medical History





- Social History


Smoking Status: Never Smoker


Frequency of alcohol use: None


Drug Abuse: None


Family History: Reviewed & Not Pertinent


Patient has suicidal ideation: No


Patient has homicidal ideation: No


Renal/ Medical History: Denies: Hx Peritoneal Dialysis


Surgical Hx: Negative





- Immunizations


Immunizations up to date: Yes


Hx Diphtheria, Pertussis, Tetanus Vaccination: Yes





Review of Systems





- Review of Systems


Constitutional: No symptoms reported


EENT: No symptoms reported


Cardiovascular: No symptoms reported


Respiratory: No symptoms reported


Gastrointestinal: Abdominal pain, Nausea, Vomiting


Genitourinary: No symptoms reported


Female Genitourinary: No symptoms reported


Musculoskeletal: No symptoms reported


Skin: No symptoms reported


Hematologic/Lymphatic: No symptoms reported


Neurological/Psychological: No symptoms reported


-: Yes All other systems reviewed and negative





Physical Exam





- Vital signs


Vitals: 


 











Temp Pulse Resp BP Pulse Ox


 


 98.5 F   120 H  19   137/84 H  99 


 


 08/08/17 05:51  08/08/17 05:51  08/08/17 05:51  08/08/17 05:51  08/08/17 05:51











Interpretation: Normal





- General


General appearance: Appears well, Alert





- HEENT


Head: Normocephalic, Atraumatic


Eyes: Normal


Pupils: PERRL





- Respiratory


Respiratory status: No respiratory distress


Chest status: Nontender


Breath sounds: Normal


Chest palpation: Normal





- Cardiovascular


Rhythm: Regular


Heart sounds: Normal auscultation


Murmur: No





- Abdominal


Inspection: Normal


Distension: No distension


Bowel sounds: Normal


Tenderness: Nontender


Organomegaly: No organomegaly





- Back


Back: Normal, Nontender





- Extremities


General upper extremity: Normal inspection, Nontender, Normal color, Normal ROM

, Normal temperature


General lower extremity: Normal inspection, Nontender, Normal color, Normal ROM

, Normal temperature, Normal weight bearing.  No: Christi's sign





- Neurological


Neuro grossly intact: Yes


Cognition: Normal


Orientation: AAOx4


Herriman Coma Scale Eye Opening: Spontaneous


Antwon Coma Scale Verbal: Oriented


Antwon Coma Scale Motor: Obeys Commands


Antwon Coma Scale Total: 15


Speech: Normal


Motor strength normal: LUE, RUE, LLE, RLE


Sensory: Normal





- Psychological


Associated symptoms: Normal affect, Normal mood





- Skin


Skin Temperature: Warm


Skin Moisture: Dry


Skin Color: Normal





Course





- Vital Signs


Vital signs: 


 











Temp Pulse Resp BP Pulse Ox


 


 98.5 F   120 H  19   137/84 H  99 


 


 08/08/17 05:51  08/08/17 05:51  08/08/17 05:51  08/08/17 05:51  08/08/17 05:51














- Laboratory


Result Diagrams: 


 08/08/17 06:50





 08/08/17 06:50


Laboratory results interpreted by me: 


 











  08/08/17 08/08/17





  06:50 06:50


 


Hgb  11.7 L 


 


Hct  35.9 L 


 


MCH  26.7 L 


 


RDW  15.3 H 


 


Seg Neutrophils %  90.4 H 


 


Lymphocytes %  5.1 L 


 


Absolute Lymphocytes  0.3 L 


 


Glucose   142 H














Discharge





- Discharge


Clinical Impression: 


Nausea & vomiting


Qualifiers:


 Vomiting type: unspecified Vomiting Intractability: unspecified Qualified Code(

s): R11.2 - Nausea with vomiting, unspecified





Condition: Good


Disposition: HOME, SELF-CARE


Instructions:  Vomiting ([facility]), Gastroenteritis (adult) ([facility])


Additional Instructions: 


Take medication as prescribed.  Return to ER symptoms worsen.  Follow-up with 

your primary care physician.


Prescriptions: 


Ondansetron [Zofran Odt 4 mg Tablet] 1 - 2 tab PO Q4H PRN #30 tab.rapdis


 PRN Reason: For Nausea/Vomiting


Forms:  Return to Work


Referrals: 


SHERRI BRUCIAGA MD [Primary Care Provider] - Follow up in 3-5 days